# Patient Record
Sex: MALE | Race: WHITE | NOT HISPANIC OR LATINO
[De-identification: names, ages, dates, MRNs, and addresses within clinical notes are randomized per-mention and may not be internally consistent; named-entity substitution may affect disease eponyms.]

---

## 2017-01-09 ENCOUNTER — APPOINTMENT (OUTPATIENT)
Dept: OPHTHALMOLOGY | Facility: CLINIC | Age: 82
End: 2017-01-09

## 2017-01-09 DIAGNOSIS — D31.32 BENIGN NEOPLASM OF RIGHT CHOROID: ICD-10-CM

## 2017-01-09 DIAGNOSIS — D31.31 BENIGN NEOPLASM OF RIGHT CHOROID: ICD-10-CM

## 2017-01-09 DIAGNOSIS — H43.813 VITREOUS DEGENERATION, BILATERAL: ICD-10-CM

## 2017-01-16 ENCOUNTER — TRANSCRIPTION ENCOUNTER (OUTPATIENT)
Age: 82
End: 2017-01-16

## 2017-01-24 ENCOUNTER — TRANSCRIPTION ENCOUNTER (OUTPATIENT)
Age: 82
End: 2017-01-24

## 2017-02-20 ENCOUNTER — RX RENEWAL (OUTPATIENT)
Age: 82
End: 2017-02-20

## 2017-03-20 ENCOUNTER — APPOINTMENT (OUTPATIENT)
Dept: CARDIOLOGY | Facility: CLINIC | Age: 82
End: 2017-03-20

## 2017-03-20 ENCOUNTER — NON-APPOINTMENT (OUTPATIENT)
Age: 82
End: 2017-03-20

## 2017-03-20 VITALS
BODY MASS INDEX: 28.56 KG/M2 | DIASTOLIC BLOOD PRESSURE: 89 MMHG | WEIGHT: 204 LBS | HEART RATE: 62 BPM | HEIGHT: 71 IN | OXYGEN SATURATION: 95 % | SYSTOLIC BLOOD PRESSURE: 163 MMHG

## 2017-03-27 ENCOUNTER — APPOINTMENT (OUTPATIENT)
Dept: PULMONOLOGY | Facility: CLINIC | Age: 82
End: 2017-03-27

## 2017-03-27 VITALS
OXYGEN SATURATION: 97 % | RESPIRATION RATE: 16 BRPM | HEART RATE: 63 BPM | SYSTOLIC BLOOD PRESSURE: 130 MMHG | TEMPERATURE: 98.1 F | HEIGHT: 71 IN | DIASTOLIC BLOOD PRESSURE: 90 MMHG | WEIGHT: 207 LBS | BODY MASS INDEX: 28.98 KG/M2

## 2017-04-03 ENCOUNTER — MEDICATION RENEWAL (OUTPATIENT)
Age: 82
End: 2017-04-03

## 2017-04-04 ENCOUNTER — OUTPATIENT (OUTPATIENT)
Dept: OUTPATIENT SERVICES | Facility: HOSPITAL | Age: 82
LOS: 1 days | End: 2017-04-04

## 2017-04-04 ENCOUNTER — APPOINTMENT (OUTPATIENT)
Dept: CV DIAGNOSITCS | Facility: HOSPITAL | Age: 82
End: 2017-04-04

## 2017-04-04 DIAGNOSIS — Z87.01 PERSONAL HISTORY OF PNEUMONIA (RECURRENT): ICD-10-CM

## 2017-04-04 DIAGNOSIS — Z95.1 PRESENCE OF AORTOCORONARY BYPASS GRAFT: Chronic | ICD-10-CM

## 2017-04-04 DIAGNOSIS — R07.89 OTHER CHEST PAIN: ICD-10-CM

## 2017-04-10 ENCOUNTER — OUTPATIENT (OUTPATIENT)
Dept: OUTPATIENT SERVICES | Facility: HOSPITAL | Age: 82
LOS: 1 days | End: 2017-04-10
Payer: MEDICARE

## 2017-04-10 ENCOUNTER — APPOINTMENT (OUTPATIENT)
Dept: CT IMAGING | Facility: CLINIC | Age: 82
End: 2017-04-10

## 2017-04-10 DIAGNOSIS — R05 COUGH: ICD-10-CM

## 2017-04-10 DIAGNOSIS — R06.02 SHORTNESS OF BREATH: ICD-10-CM

## 2017-04-10 DIAGNOSIS — Z95.1 PRESENCE OF AORTOCORONARY BYPASS GRAFT: Chronic | ICD-10-CM

## 2017-04-10 DIAGNOSIS — Z87.01 PERSONAL HISTORY OF PNEUMONIA (RECURRENT): ICD-10-CM

## 2017-04-10 PROCEDURE — 71250 CT THORAX DX C-: CPT

## 2017-05-24 ENCOUNTER — MEDICATION RENEWAL (OUTPATIENT)
Age: 82
End: 2017-05-24

## 2017-05-25 ENCOUNTER — MEDICATION RENEWAL (OUTPATIENT)
Age: 82
End: 2017-05-25

## 2017-06-30 ENCOUNTER — APPOINTMENT (OUTPATIENT)
Dept: OPHTHALMOLOGY | Facility: CLINIC | Age: 82
End: 2017-06-30

## 2017-07-20 ENCOUNTER — MEDICATION RENEWAL (OUTPATIENT)
Age: 82
End: 2017-07-20

## 2017-07-31 ENCOUNTER — TRANSCRIPTION ENCOUNTER (OUTPATIENT)
Age: 82
End: 2017-07-31

## 2017-07-31 ENCOUNTER — INPATIENT (INPATIENT)
Facility: HOSPITAL | Age: 82
LOS: 0 days | Discharge: HOME | End: 2017-08-01
Attending: HOSPITALIST | Admitting: HOSPITALIST
Payer: MEDICARE

## 2017-07-31 VITALS
TEMPERATURE: 98 F | OXYGEN SATURATION: 98 % | DIASTOLIC BLOOD PRESSURE: 93 MMHG | RESPIRATION RATE: 20 BRPM | HEART RATE: 58 BPM | SYSTOLIC BLOOD PRESSURE: 171 MMHG

## 2017-07-31 DIAGNOSIS — R07.9 CHEST PAIN, UNSPECIFIED: ICD-10-CM

## 2017-07-31 DIAGNOSIS — Z95.1 PRESENCE OF AORTOCORONARY BYPASS GRAFT: Chronic | ICD-10-CM

## 2017-07-31 LAB
ALBUMIN SERPL ELPH-MCNC: 4.2 G/DL — SIGNIFICANT CHANGE UP (ref 3.3–5)
ALP SERPL-CCNC: 57 U/L — SIGNIFICANT CHANGE UP (ref 40–120)
ALT FLD-CCNC: 13 U/L — SIGNIFICANT CHANGE UP (ref 4–41)
APPEARANCE UR: CLEAR — SIGNIFICANT CHANGE UP
APTT BLD: 30.6 SEC — SIGNIFICANT CHANGE UP (ref 27.5–37.4)
AST SERPL-CCNC: 15 U/L — SIGNIFICANT CHANGE UP (ref 4–40)
BASE EXCESS BLDV CALC-SCNC: 1.8 MMOL/L — SIGNIFICANT CHANGE UP
BASOPHILS # BLD AUTO: 0.02 K/UL — SIGNIFICANT CHANGE UP (ref 0–0.2)
BASOPHILS NFR BLD AUTO: 0.3 % — SIGNIFICANT CHANGE UP (ref 0–2)
BILIRUB SERPL-MCNC: 0.5 MG/DL — SIGNIFICANT CHANGE UP (ref 0.2–1.2)
BILIRUB UR-MCNC: NEGATIVE — SIGNIFICANT CHANGE UP
BLD GP AB SCN SERPL QL: NEGATIVE — SIGNIFICANT CHANGE UP
BLOOD GAS VENOUS - CREATININE: 1.48 MG/DL — HIGH (ref 0.5–1.3)
BLOOD UR QL VISUAL: NEGATIVE — SIGNIFICANT CHANGE UP
BUN SERPL-MCNC: 24 MG/DL — HIGH (ref 7–23)
CALCIUM SERPL-MCNC: 9.1 MG/DL — SIGNIFICANT CHANGE UP (ref 8.4–10.5)
CHLORIDE BLDV-SCNC: 108 MMOL/L — SIGNIFICANT CHANGE UP (ref 96–108)
CHLORIDE SERPL-SCNC: 104 MMOL/L — SIGNIFICANT CHANGE UP (ref 98–107)
CK SERPL-CCNC: 130 U/L — SIGNIFICANT CHANGE UP (ref 30–200)
CO2 SERPL-SCNC: 24 MMOL/L — SIGNIFICANT CHANGE UP (ref 22–31)
COLOR SPEC: SIGNIFICANT CHANGE UP
CREAT SERPL-MCNC: 1.36 MG/DL — HIGH (ref 0.5–1.3)
EOSINOPHIL # BLD AUTO: 0.1 K/UL — SIGNIFICANT CHANGE UP (ref 0–0.5)
EOSINOPHIL NFR BLD AUTO: 1.7 % — SIGNIFICANT CHANGE UP (ref 0–6)
GAS PNL BLDV: 133 MMOL/L — LOW (ref 136–146)
GLUCOSE BLDV-MCNC: 103 — HIGH (ref 70–99)
GLUCOSE SERPL-MCNC: 102 MG/DL — HIGH (ref 70–99)
GLUCOSE UR-MCNC: NEGATIVE — SIGNIFICANT CHANGE UP
HCO3 BLDV-SCNC: 25 MMOL/L — SIGNIFICANT CHANGE UP (ref 20–27)
HCT VFR BLD CALC: 40.6 % — SIGNIFICANT CHANGE UP (ref 39–50)
HCT VFR BLDV CALC: 43.5 % — SIGNIFICANT CHANGE UP (ref 39–51)
HGB BLD-MCNC: 13.9 G/DL — SIGNIFICANT CHANGE UP (ref 13–17)
HGB BLDV-MCNC: 14.2 G/DL — SIGNIFICANT CHANGE UP (ref 13–17)
IMM GRANULOCYTES # BLD AUTO: 0.01 # — SIGNIFICANT CHANGE UP
IMM GRANULOCYTES NFR BLD AUTO: 0.2 % — SIGNIFICANT CHANGE UP (ref 0–1.5)
INR BLD: 1.02 — SIGNIFICANT CHANGE UP (ref 0.88–1.17)
KETONES UR-MCNC: NEGATIVE — SIGNIFICANT CHANGE UP
LACTATE BLDV-MCNC: 1.2 MMOL/L — SIGNIFICANT CHANGE UP (ref 0.5–2)
LEUKOCYTE ESTERASE UR-ACNC: NEGATIVE — SIGNIFICANT CHANGE UP
LYMPHOCYTES # BLD AUTO: 0.84 K/UL — LOW (ref 1–3.3)
LYMPHOCYTES # BLD AUTO: 14.1 % — SIGNIFICANT CHANGE UP (ref 13–44)
MCHC RBC-ENTMCNC: 32 PG — SIGNIFICANT CHANGE UP (ref 27–34)
MCHC RBC-ENTMCNC: 34.2 % — SIGNIFICANT CHANGE UP (ref 32–36)
MCV RBC AUTO: 93.5 FL — SIGNIFICANT CHANGE UP (ref 80–100)
MONOCYTES # BLD AUTO: 0.54 K/UL — SIGNIFICANT CHANGE UP (ref 0–0.9)
MONOCYTES NFR BLD AUTO: 9.1 % — SIGNIFICANT CHANGE UP (ref 2–14)
MUCOUS THREADS # UR AUTO: SIGNIFICANT CHANGE UP
NEUTROPHILS # BLD AUTO: 4.44 K/UL — SIGNIFICANT CHANGE UP (ref 1.8–7.4)
NEUTROPHILS NFR BLD AUTO: 74.6 % — SIGNIFICANT CHANGE UP (ref 43–77)
NITRITE UR-MCNC: NEGATIVE — SIGNIFICANT CHANGE UP
NRBC # FLD: 0 — SIGNIFICANT CHANGE UP
PCO2 BLDV: 47 MMHG — SIGNIFICANT CHANGE UP (ref 41–51)
PH BLDV: 7.37 PH — SIGNIFICANT CHANGE UP (ref 7.32–7.43)
PH UR: 6.5 — SIGNIFICANT CHANGE UP (ref 4.6–8)
PLATELET # BLD AUTO: 137 K/UL — LOW (ref 150–400)
PMV BLD: 11.4 FL — SIGNIFICANT CHANGE UP (ref 7–13)
PO2 BLDV: 43 MMHG — HIGH (ref 35–40)
POTASSIUM BLDV-SCNC: 4.5 MMOL/L — SIGNIFICANT CHANGE UP (ref 3.4–4.5)
POTASSIUM SERPL-MCNC: 4.9 MMOL/L — SIGNIFICANT CHANGE UP (ref 3.5–5.3)
POTASSIUM SERPL-SCNC: 4.9 MMOL/L — SIGNIFICANT CHANGE UP (ref 3.5–5.3)
PROT SERPL-MCNC: 7.3 G/DL — SIGNIFICANT CHANGE UP (ref 6–8.3)
PROT UR-MCNC: NEGATIVE — SIGNIFICANT CHANGE UP
PROTHROM AB SERPL-ACNC: 11.4 SEC — SIGNIFICANT CHANGE UP (ref 9.8–13.1)
RBC # BLD: 4.34 M/UL — SIGNIFICANT CHANGE UP (ref 4.2–5.8)
RBC # FLD: 13.3 % — SIGNIFICANT CHANGE UP (ref 10.3–14.5)
RBC CASTS # UR COMP ASSIST: SIGNIFICANT CHANGE UP (ref 0–?)
RH IG SCN BLD-IMP: POSITIVE — SIGNIFICANT CHANGE UP
SAO2 % BLDV: 73.6 % — SIGNIFICANT CHANGE UP (ref 60–85)
SODIUM SERPL-SCNC: 139 MMOL/L — SIGNIFICANT CHANGE UP (ref 135–145)
SP GR SPEC: 1.01 — SIGNIFICANT CHANGE UP (ref 1–1.03)
SQUAMOUS # UR AUTO: SIGNIFICANT CHANGE UP
TROPONIN T SERPL-MCNC: < 0.06 NG/ML — SIGNIFICANT CHANGE UP (ref 0–0.06)
UROBILINOGEN FLD QL: NORMAL E.U. — SIGNIFICANT CHANGE UP (ref 0.1–0.2)
WBC # BLD: 5.95 K/UL — SIGNIFICANT CHANGE UP (ref 3.8–10.5)
WBC # FLD AUTO: 5.95 K/UL — SIGNIFICANT CHANGE UP (ref 3.8–10.5)
WBC UR QL: SIGNIFICANT CHANGE UP (ref 0–?)

## 2017-07-31 PROCEDURE — 71020: CPT | Mod: 26

## 2017-07-31 PROCEDURE — 99223 1ST HOSP IP/OBS HIGH 75: CPT

## 2017-07-31 PROCEDURE — 93459 L HRT ART/GRFT ANGIO: CPT | Mod: 26

## 2017-07-31 RX ORDER — ASPIRIN/CALCIUM CARB/MAGNESIUM 324 MG
81 TABLET ORAL ONCE
Qty: 0 | Refills: 0 | Status: COMPLETED | OUTPATIENT
Start: 2017-07-31 | End: 2017-07-31

## 2017-07-31 RX ORDER — HYDRALAZINE HCL 50 MG
5 TABLET ORAL ONCE
Qty: 0 | Refills: 0 | Status: DISCONTINUED | OUTPATIENT
Start: 2017-07-31 | End: 2017-08-01

## 2017-07-31 RX ORDER — SIMVASTATIN 20 MG/1
20 TABLET, FILM COATED ORAL AT BEDTIME
Qty: 0 | Refills: 0 | Status: DISCONTINUED | OUTPATIENT
Start: 2017-07-31 | End: 2017-08-01

## 2017-07-31 RX ORDER — LISINOPRIL 2.5 MG/1
20 TABLET ORAL DAILY
Qty: 0 | Refills: 0 | Status: DISCONTINUED | OUTPATIENT
Start: 2017-07-31 | End: 2017-08-01

## 2017-07-31 RX ORDER — ASPIRIN/CALCIUM CARB/MAGNESIUM 324 MG
81 TABLET ORAL DAILY
Qty: 0 | Refills: 0 | Status: DISCONTINUED | OUTPATIENT
Start: 2017-07-31 | End: 2017-08-01

## 2017-07-31 RX ORDER — CARVEDILOL PHOSPHATE 80 MG/1
3.12 CAPSULE, EXTENDED RELEASE ORAL EVERY 12 HOURS
Qty: 0 | Refills: 0 | Status: DISCONTINUED | OUTPATIENT
Start: 2017-07-31 | End: 2017-08-01

## 2017-07-31 RX ORDER — HYDRALAZINE HCL 50 MG
10 TABLET ORAL ONCE
Qty: 0 | Refills: 0 | Status: DISCONTINUED | OUTPATIENT
Start: 2017-07-31 | End: 2017-07-31

## 2017-07-31 RX ORDER — HYDRALAZINE HCL 50 MG
5 TABLET ORAL ONCE
Qty: 0 | Refills: 0 | Status: DISCONTINUED | OUTPATIENT
Start: 2017-07-31 | End: 2017-07-31

## 2017-07-31 RX ORDER — ACETAMINOPHEN 500 MG
650 TABLET ORAL EVERY 6 HOURS
Qty: 0 | Refills: 0 | Status: DISCONTINUED | OUTPATIENT
Start: 2017-07-31 | End: 2017-08-01

## 2017-07-31 RX ORDER — CLOPIDOGREL BISULFATE 75 MG/1
75 TABLET, FILM COATED ORAL DAILY
Qty: 0 | Refills: 0 | Status: DISCONTINUED | OUTPATIENT
Start: 2017-07-31 | End: 2017-08-01

## 2017-07-31 RX ADMIN — Medication 81 MILLIGRAM(S): at 13:59

## 2017-07-31 RX ADMIN — SIMVASTATIN 20 MILLIGRAM(S): 20 TABLET, FILM COATED ORAL at 23:52

## 2017-07-31 NOTE — H&P CARDIOLOGY - HISTORY OF PRESENT ILLNESS
81 year old male with HTN, hyperlipidemia, descending aortic aneurysm surgically repaired/CABG (LIMA to LAD) 2000 with subsequent PTCA/stent per patient, AVNRT ablation 2014 who presented to [facility] ED 7/31/17 complaining of SOB and chest pain for the past 3weeks. Patient explains that he has noted labored breathing with associated chest pressure walking up a flight of stairs, relieved with rest. Admits to a decrease in exercise tolerance and increase in fatigue. Patient subsequently saw an advertisement for heart disease and developed concern he could need another stent, this prompted him to present to the ED.   First troponin negative, EKG with Twave inversions AVL.   In light of patients CAD history and symptoms there is high suspicion for CAD progression. Patient is now referred to [facility] for a cardiac catheterization with possible PTCA/stent. 81 year old male with HTN, hyperlipidemia, descending aortic aneurysm surgically repaired/CABG (LIMA to LAD) 2000 with subsequent PTCA/stent per patient, AVNRT ablation 2014 who presented to [facility] ED 7/31/17 complaining of SOB and chest pain for the past 3weeks. Patient explains that he has noted labored breathing with associated chest pressure walking up a flight of stairs, relieved with rest. Admits to a decrease in exercise tolerance and increase in fatigue. Patient subsequently saw an advertisement for heart disease and developed concern he could need another stent, this prompted him to present to the ED.   First troponin negative, EKG with Twave inversions AVL.   In light of patients CAD history and symptoms there is high suspicion for CAD progression. Patient is now referred to [facility] for a cardiac catheterization with possible PTCA/stent.

## 2017-07-31 NOTE — DISCHARGE NOTE ADULT - PLAN OF CARE
remain chest pain free Follow up with Dr White in 1week  continue prescribed medications  low salt, low fat, low cholesterol /60 continue prescribed medications  low salt, low fat, low cholesterol Maintain normal cholesterol levels to prevent stroke and heart attack Low fat diet, Continue current medications. Follow up with primary care physician and cardiologist To prevent chest pain, heart attack and worsening coronary artery disease To maintain a normal blood pressure to prevent heart attack, stroke and renal failure Follow up with Dr White in 1-2 weeks  continue prescribed medications  low salt, low fat, low cholesterol

## 2017-07-31 NOTE — ED ADULT NURSE NOTE - PSH
Aneurysm of descending aorta  12 years ago  H/O: Knee Surgery    S/P CABG (coronary artery bypass graft)  LIMA to LAD  stent

## 2017-07-31 NOTE — DISCHARGE NOTE ADULT - CARE PLAN
Principal Discharge DX:	CAD (coronary artery disease)  Goal:	remain chest pain free  Instructions for follow-up, activity and diet:	Follow up with Dr White in 1week  continue prescribed medications  low salt, low fat, low cholesterol  Secondary Diagnosis:	HTN (hypertension)  Goal:	/60  Instructions for follow-up, activity and diet:	continue prescribed medications  low salt, low fat, low cholesterol Principal Discharge DX:	CAD (coronary artery disease)  Goal:	To prevent chest pain, heart attack and worsening coronary artery disease  Instructions for follow-up, activity and diet:	Follow up with Dr White in 1week  continue prescribed medications  low salt, low fat, low cholesterol  Secondary Diagnosis:	HTN (hypertension)  Goal:	To maintain a normal blood pressure to prevent heart attack, stroke and renal failure  Instructions for follow-up, activity and diet:	continue prescribed medications  low salt, low fat, low cholesterol  Secondary Diagnosis:	Hyperlipidemia  Goal:	Maintain normal cholesterol levels to prevent stroke and heart attack  Instructions for follow-up, activity and diet:	Low fat diet, Continue current medications. Follow up with primary care physician and cardiologist Principal Discharge DX:	CAD (coronary artery disease)  Goal:	To prevent chest pain, heart attack and worsening coronary artery disease  Instructions for follow-up, activity and diet:	Follow up with Dr White in 1-2 weeks  continue prescribed medications  low salt, low fat, low cholesterol  Secondary Diagnosis:	HTN (hypertension)  Goal:	To maintain a normal blood pressure to prevent heart attack, stroke and renal failure  Instructions for follow-up, activity and diet:	continue prescribed medications  low salt, low fat, low cholesterol  Secondary Diagnosis:	Hyperlipidemia  Goal:	Maintain normal cholesterol levels to prevent stroke and heart attack  Instructions for follow-up, activity and diet:	Low fat diet, Continue current medications. Follow up with primary care physician and cardiologist

## 2017-07-31 NOTE — DISCHARGE NOTE ADULT - HOSPITAL COURSE
81 year old male with HTN, hyperlipidemia, descending aortic aneurysm surgically repaired/CABG (LIMA to LAD) 2000 with subsequent PTCA/stent per patient, AVNRT ablation 2014 who presented to [facility] ED 7/31/17 complaining of SOB and chest pain for the past 3weeks. Patient explains that he has noted labored breathing with associated chest pressure walking up a flight of stairs, relieved with rest. Admits to a decrease in exercise tolerance and increase in fatigue. Patient subsequently saw an advertisement for heart disease and developed concern he could need another stent, this prompted him to present to the ED.   First troponin negative, EKG with Twave inversions AVL.   In light of patients CAD history and symptoms there is high suspicion for CAD progression. Patient is now referred to [facility] for a cardiac catheterization with possible PTCA/stent.   Underwent a Cardiac catheterization via right femoral artery access revealing..................................................................  Patient without complaints, stable for discharge to home if femoral artery access remains stable with ambulation with outpatient cardiologist follow up with Dr White. 81 year old male with HTN, hyperlipidemia, descending aortic aneurysm surgically repaired/CABG (LIMA to LAD) 2000 with subsequent PTCA/stent per patient, AVNRT ablation 2014 who presented to [facility] ED 7/31/17 complaining of SOB and chest pain for the past 3weeks. Patient explains that he has noted labored breathing with associated chest pressure walking up a flight of stairs, relieved with rest. Admits to a decrease in exercise tolerance and increase in fatigue. Patient subsequently saw an advertisement for heart disease and developed concern he could need another stent, this prompted him to present to the ED.   First troponin negative, EKG with Twave inversions AVL.   In light of patients CAD history and symptoms there is high suspicion for CAD progression. Patient is now referred to [facility] for a cardiac catheterization with possible PTCA/stent.   Underwent a Cardiac catheterization via right femoral artery access revealing mLAD 100 ISR, dLAD 80, pLCx 20-30, pOM2 40, RCA luminal, Diag1 20 ISR, LIMA to LAD patent; - which is stable from previous cath. RFA site stable no hematoma, no bleeding .   Patient without complaints, stable for discharge to home. Outpatient cardiologist follow up with Dr White.

## 2017-07-31 NOTE — CONSULT NOTE ADULT - ASSESSMENT
81M with CAD , CABG 2000 , AVNRT ablation, HTN. Patient presents with typical chest pain, new for three weeks.    - chest pain concerning for unstable angina  - given high probability of CAD, will proceed directly to [facility] today.   - keep NPO  -   - plavix 600  -atorvastatin 80   - general cardiology consult to continue to follow  - thank you for this interesting consult.   - For any questions please call [phone]

## 2017-07-31 NOTE — DISCHARGE NOTE ADULT - INSTRUCTIONS
No driving x 24 hours.  No heavy lifting for one week. No strenuous activity x 3 weeks.  Monitor site of procedure and notify your doctor for any redness/swelling/discharge.  You may shower but no baths or swimming for one week or until skin is healed. low salt, low cholesterol diet

## 2017-07-31 NOTE — H&P CARDIOLOGY - PMH
Aneurysm of descending aorta  treated surgically 12 years ago  CAD (Coronary Artery Disease)    CHF (Congestive Heart Failure)    Dyslipidemia    History of prostate cancer  treated with cryotherapy  HTN (Hypertension), Benign    Hyperlipidemia    Old MI (myocardial infarction)  12 years ago  Status post primary angioplasty with coronary stent  5-6 years ago Aneurysm of descending aorta  treated surgically 12 years ago  Back spasm  patient admits to left sided back spasms for a long time that is worse laying in bed for long periods of time  CAD (Coronary Artery Disease)    CHF (Congestive Heart Failure)    Dyslipidemia    History of prostate cancer  treated with cryotherapy  HTN (Hypertension), Benign    Hyperlipidemia    Old MI (myocardial infarction)  12 years ago  Status post primary angioplasty with coronary stent  5-6 years ago

## 2017-07-31 NOTE — CONSULT NOTE ADULT - SUBJECTIVE AND OBJECTIVE BOX
HISTORY OF PRESENT ILLNESS:  81M hx descending aortic aneurism CABG 2000,  AVNRT ablation 2014,     Has three weeks of worsening dyspnea , which he describes as labored breathing.  Patient also has central substernal chest pain radiating to arms. 5/10, relieved by rest, worsened by exertion or stress. Absent at rest.  He has had similar symptoms in the past when he required a stent.    No fever, chills, no aches in limbs, no KOLTON, orthopnea.         Allergies    No Known Allergies    Intolerances    	    MEDICATIONS:  ASA 81  Plavix 75  lisinopril 20  simvastatin 40  metoprolol succ 50 XL              PAST MEDICAL & SURGICAL HISTORY:  Status post primary angioplasty with coronary stent: 5-6 years ago  Old MI (myocardial infarction): 12 years ago  History of prostate cancer: treated with cryotherapy  Aneurysm of descending aorta: treated surgically 12 years ago  CAD (Coronary Artery Disease)  HTN (Hypertension), Benign  Dyslipidemia  CHF (Congestive Heart Failure)  S/P CABG (coronary artery bypass graft): LIMA to LAD  Aneurysm of descending aorta: 12 years ago  H/O: Knee Surgery  stent      FAMILY HISTORY:  Family history of blood clots  Family history of coronary artery disease: Mother and father      SOCIAL HISTORY:    [ ] Non-smoker  [ ] Smoker-  former quit 50 years ago.   [ ] Alcohol -  lives at home, [place]    REVIEW OF SYSTEMS:  General: no fatigue/malaise, weight loss/gain.  Skin: no rashes.  Ophthalmologic: no blurred vision, no loss of vision. 	  ENT: no sore throat, rhinorrhea, sinus congestion.  Respiratory: no SOB, cough or wheeze.  Gastrointestinal:  no N/V/D, no melena/hematemesis/hematochezia.  Genitourinary: no dysuria/hesitancy or hematuria.  Musculoskeletal: no myalgias or arthralgias.  Neurological: no changes in vision or hearing, no lightheadedness/dizziness, no syncope/near syncope	  Psychiatric: no unusual stress/anxiety.   Hematology/Lymphatics: no unusual bleeding, bruising and no lymphadenopathy.  Endocrine: no unusual thirst.   All others negative except as stated above and in HPI.    PHYSICAL EXAM:  T(C): 36.4 (07-31-17 @ 10:38), Max: 36.4 (07-31-17 @ 10:38)  HR: 57 (07-31-17 @ 11:01) (57 - 58)  BP: 198/83 (07-31-17 @ 11:01) (171/93 - 198/83)  RR: 18 (07-31-17 @ 11:01) (18 - 20)  SpO2: 99% (07-31-17 @ 11:01) (98% - 99%)  Wt(kg): --  I&O's Summary      Appearance: Normal	  HEENT:   Normal oral mucosa, PERRL, EOMI	  Lymphatic: No lymphadenopathy  Cardiovascular: Normal S1 S2, No JVD, No murmurs, No edema  Respiratory: Lungs clear to auscultation	  Psychiatry: A & O x 3, Mood & affect appropriate  Gastrointestinal:  Soft, Non-tender, + BS	  Skin: No rashes, No ecchymoses, No cyanosis	  Neurologic: Non-focal  Extremities: Normal range of motion, No clubbing, cyanosis or edema  Vascular: Peripheral pulses palpable 2+ bilaterally        LABS:	 	    CBC Full  -  ( 31 Jul 2017 11:10 )  WBC Count : 5.95 K/uL  Hemoglobin : 13.9 g/dL  Hematocrit : 40.6 %  Platelet Count - Automated : 137 K/uL  Mean Cell Volume : 93.5 fL  Mean Cell Hemoglobin : 32.0 pg  Mean Cell Hemoglobin Concentration : 34.2 %  Auto Neutrophil # : 4.44 K/uL  Auto Lymphocyte # : 0.84 K/uL  Auto Monocyte # : 0.54 K/uL  Auto Eosinophil # : 0.10 K/uL  Auto Basophil # : 0.02 K/uL  Auto Neutrophil % : 74.6 %  Auto Lymphocyte % : 14.1 %  Auto Monocyte % : 9.1 %  Auto Eosinophil % : 1.7 %  Auto Basophil % : 0.3 %    07-31    139  |  104  |  24<H>  ----------------------------<  102<H>  4.9   |  24  |  1.36<H>    Ca    9.1      31 Jul 2017 11:10    TPro  7.3  /  Alb  4.2  /  TBili  0.5  /  DBili  x   /  AST  15  /  ALT  13  /  AlkPhos  57  07-31      proBNP:   Lipid Profile:   HgA1c:   TSH:       CARDIAC MARKERS:  Troponin T, Serum (07.31.17 @ 11:10)    Troponin T, Serum: < 0.06: INCLUDES THE 99th PERCENTILE OF A HEALTHY  POPULATION AT A  METHOD C.V. OF 10% OR LESS.    TROPONIN T IS MEASURED BY THE ROCHE ELECSYS ECLIA METHOD. ng/mL      TELEMETRY: 	    ECG:  	NSR @60 , nonspecific chronic ST changes.   RADIOLOGY:  OTHER: 	    PREVIOUS DIAGNOSTIC TESTING:    [ ] Echocardiogram: < from: TTE with Doppler (w/Cont) (04.04.17 @ 07:13) >    Patient name: FREDERIC COSTELLO  YOB: 1935   Age: 81 (M)   MR#: [id number]  Study Date: 4/4/2017  Location: O/PSonographer: Zeinab Marie  Study quality: Technically Difficult  Referring Physician: Rubina White MD  Blood Pressure: 123/69 mmHg  Height: 178 cm  Weight: 91 kg  BSA: 2.1 m2  ------------------------------------------------------------------------  PROCEDURE: Transthoracic echocardiogram with 2-D, M-Mode  and complete spectral and color flow Doppler.  Verbal consent wasobtained for injection of echo contrast.  Following  intravenous injection of contrast, harmonic  imaging was performed.  INDICATION: Abnormal electrocardiogram (ECG) (EKG) (R94.31)  ------------------------------------------------------------------------  OBSERVATIONS:  Mitral Valve: Mitral annular calcification, otherwise  normal mitral valve. Minimal mitral regurgitation.  Aortic Root: Normal aortic root.  Aortic Valve: Aortic valve leaflet morphology not well  visualized.  Left Atrium: Normalleft atrium.  Left Ventricle: Endocardium not well visualized; grossly  mild to moderate segmental left ventricular systolic  dysfunction.  Hypokinesis of the basal inferior wall,  lateral wall, and inferolateral wall.  Endocardial  visualization enhanced with intravenous injection of echo  contrast (Definity).  Right Heart: Normal right atrium. The right ventricle is  not well visualized; grossly normal right ventricular  systolic function. Normal tricuspid valve. Minimal  tricuspid regurgitation. Normal pulmonic valve.  Pericardium/PleuraNormal pericardium with no pericardial  effusion.  ------------------------------------------------------------------------  CONCLUSIONS:  1. Mitral annular calcification, otherwise normal mitral  valve. Minimal mitral regurgitation.  2. Endocardium not well visualized; grossly mild to  moderate segmental left ventricular systolic dysfunction.  Hypokinesis of the basal inferior wall,  lateral wall, and  inferolateral wall.  Endocardial visualization enhanced  with intravenous injection of echo contrast (Definity).  3. The right ventricle is not well visualized; grossly  normal right ventricular systolic function.  *** Compared with echocardiogram of 5/19/2016, no  significant changes noted.  ------------------------------------------------------------------------  Confirmed on  4/4/2017 - 09:29:52 by Austen Patel M.D.  ------------------------------------------------------------------------    < end of copied text >    [ ]  Catheterization:  [ ] Stress Test:

## 2017-07-31 NOTE — H&P CARDIOLOGY - FAMILY HISTORY
Father  Still living? No  Family history of coronary artery disease, Age at diagnosis: Age Unknown  Family history of blood clots, Age at diagnosis: Age Unknown     Mother  Still living? No  Family history of coronary artery disease, Age at diagnosis: Age Unknown

## 2017-07-31 NOTE — DISCHARGE NOTE ADULT - PATIENT PORTAL LINK FT
“You can access the [facility] Patient Portal, offered by [facility], by registering with the following website: [email or web address]”

## 2017-07-31 NOTE — ED ADULT NURSE NOTE - PMH
Aneurysm of descending aorta  treated surgically 12 years ago  CAD (Coronary Artery Disease)    CHF (Congestive Heart Failure)    Dyslipidemia    History of prostate cancer  treated with cryotherapy  HTN (Hypertension), Benign    Old MI (myocardial infarction)  12 years ago  Status post primary angioplasty with coronary stent  5-6 years ago

## 2017-07-31 NOTE — ED PROVIDER NOTE - ATTENDING CONTRIBUTION TO CARE
Dr. Cardenas: I performed a face to face bedside interview with patient regarding history of present illness, review of symptoms and past medical history. I completed an independent physical exam.  I have discussed patient's plan of care with PA.   I agree with note as stated above, having amended the EMR as needed to reflect my findings.   This includes HISTORY OF PRESENT ILLNESS, HIV, PAST MEDICAL/SURGICAL/FAMILY/SOCIAL HISTORY, ALLERGIES AND HOME MEDICATIONS, REVIEW OF SYSTEMS, PHYSICAL EXAM, and any PROGRESS NOTES during the time I functioned as the attending physician for this patient. HPI above as by me. PE above as by me. EKG nsr nl axis neg st abn t wave inv v5-v6 DDX Worsening exertional chest pain in 82 yo male with known CAD, high risk for acs PLAN addl aspirin 81 mg po, cbc, cmp, trop, ck ckmb, cxr, call Christopher. Recommend admission for inpatient stress test.

## 2017-07-31 NOTE — DISCHARGE NOTE ADULT - MEDICATION SUMMARY - MEDICATIONS TO TAKE
I will START or STAY ON the medications listed below when I get home from the hospital:    Ecotrin Adult Low Strength 81 mg oral delayed release tablet  -- 1 tab(s) by mouth once a day  -- Indication: For CAD (coronary artery disease)    lisinopril 20 mg oral tablet  -- 1 tab(s) by mouth once a day  -- Indication: For CAD (coronary artery disease)    simvastatin 20 mg oral tablet  -- 1 tab(s) by mouth once a day (at bedtime)  -- Indication: For Hyperlipidemia    clopidogrel 75 mg oral tablet  -- 1 tab(s) by mouth once a day  -- Indication: For CAD (coronary artery disease)    carvedilol 3.125 mg oral tablet  -- 1 tab(s) by mouth 2 times a day  -- Indication: For CAD (coronary artery disease)

## 2017-07-31 NOTE — H&P CARDIOLOGY - NS MD HP PULSE RADIAL
right normal/vasiliy test normal right/left normal left normal/vasiliy test normal bilateral/right normal

## 2017-07-31 NOTE — ED PROVIDER NOTE - OBJECTIVE STATEMENT
82 y/o male pmh htn, hld, CAD s/p 2 stents c/o chest pain and sob x2 weeks, worse x2 days. Pt admits to exertional chest pain and sob over the last 2 weeks. Pt admits to having symptoms after walking up 2 flights of stairs or riding a stationary bike x2 minutes. Pt states that this feels similar to when he had his last stent placed. Pt admits to relief of symptoms with rest. Denies diaphoresis, palpitations, n/v/d, numbness, tingling, weakness, dizziness, syncope, fever or chills. 82 y/o male pmh htn, hld, CAD s/p 2 stents c/o chest pain and sob x2 weeks, worse x2 days. Pt admits to exertional chest pain and sob over the last 2 weeks. Pt admits to having symptoms after walking up 2 flights of stairs or riding a stationary bike x2 minutes. Pt states that this feels similar to when he had his last stent placed. Pt admits to relief of symptoms with rest. Denies diaphoresis, palpitations, n/v/d, numbness, tingling, weakness, dizziness, syncope, fever or chills.    12:56 [facility] att: 81M h/o htn, hld, cad, 2 stents (date unk), descending aortic stent (10-15 yrs ago) c/o exertional cp x wks. Past few weeks patient notes "any exertion" will elicit labored breathing and chest pain. Specifies midsternal, dull pressure, rad rue, x minutes, resolves with rest. As of last day patient unable to walk or do anything secondary to chest pain. Called his Cardiologist Dr. Rubina White to arrange outpatient stress test, Christopher on vacation, office directed patient to ED for eval. Patient took Aspiring 81 mg po prior to ED arrival.

## 2017-07-31 NOTE — DISCHARGE NOTE ADULT - CARE PROVIDER_API CALL
Rubina White), Cardiology; Internal Medicine  [address] [place] [address]  Phone: [phone]  Fax: [phone]

## 2017-07-31 NOTE — ED ADULT NURSE NOTE - OBJECTIVE STATEMENT
Received patient to room 14. Patient is alert and oriented times four with c/o chest pain and the symptoms he is experiencing are the same as the ones when he had his second stent placed. IVL placed to left AC 20 gauge and labs drawn and sent. Pt evaluated by MD. VS recorded and systolic blood pressure is elevated. Pt is on cardiac monitor and awasiting results, further orders and disposition.     ONDINA Morgan

## 2017-07-31 NOTE — DISCHARGE NOTE ADULT - NS AS ACTIVITY OBS
No driving x 24 hours.  No heavy lifting for one week. No strenuous activity x 3 weeks.  Monitor site of procedure and notify your doctor for any redness/swelling/discharge.  You may shower but no baths or swimming for one week or until skin is healed./Walking-Indoors allowed/Walking-Outdoors allowed/Stairs allowed/Showering allowed/No Heavy lifting/straining

## 2017-08-01 VITALS
OXYGEN SATURATION: 100 % | RESPIRATION RATE: 18 BRPM | SYSTOLIC BLOOD PRESSURE: 136 MMHG | TEMPERATURE: 98 F | HEART RATE: 55 BPM | DIASTOLIC BLOOD PRESSURE: 68 MMHG

## 2017-08-01 DIAGNOSIS — I25.10 ATHEROSCLEROTIC HEART DISEASE OF NATIVE CORONARY ARTERY WITHOUT ANGINA PECTORIS: ICD-10-CM

## 2017-08-01 DIAGNOSIS — D69.6 THROMBOCYTOPENIA, UNSPECIFIED: ICD-10-CM

## 2017-08-01 DIAGNOSIS — R79.89 OTHER SPECIFIED ABNORMAL FINDINGS OF BLOOD CHEMISTRY: ICD-10-CM

## 2017-08-01 DIAGNOSIS — I10 ESSENTIAL (PRIMARY) HYPERTENSION: ICD-10-CM

## 2017-08-01 DIAGNOSIS — I20.9 ANGINA PECTORIS, UNSPECIFIED: ICD-10-CM

## 2017-08-01 LAB
BUN SERPL-MCNC: 24 MG/DL — HIGH (ref 7–23)
CALCIUM SERPL-MCNC: 9.1 MG/DL — SIGNIFICANT CHANGE UP (ref 8.4–10.5)
CHLORIDE SERPL-SCNC: 104 MMOL/L — SIGNIFICANT CHANGE UP (ref 98–107)
CO2 SERPL-SCNC: 22 MMOL/L — SIGNIFICANT CHANGE UP (ref 22–31)
CREAT SERPL-MCNC: 1.21 MG/DL — SIGNIFICANT CHANGE UP (ref 0.5–1.3)
GLUCOSE SERPL-MCNC: 92 MG/DL — SIGNIFICANT CHANGE UP (ref 70–99)
HCT VFR BLD CALC: 42.4 % — SIGNIFICANT CHANGE UP (ref 39–50)
HGB BLD-MCNC: 14.4 G/DL — SIGNIFICANT CHANGE UP (ref 13–17)
MCHC RBC-ENTMCNC: 31.4 PG — SIGNIFICANT CHANGE UP (ref 27–34)
MCHC RBC-ENTMCNC: 34 % — SIGNIFICANT CHANGE UP (ref 32–36)
MCV RBC AUTO: 92.6 FL — SIGNIFICANT CHANGE UP (ref 80–100)
NRBC # FLD: 0 — SIGNIFICANT CHANGE UP
PLATELET # BLD AUTO: 134 K/UL — LOW (ref 150–400)
PMV BLD: 11.3 FL — SIGNIFICANT CHANGE UP (ref 7–13)
POTASSIUM SERPL-MCNC: 4.7 MMOL/L — SIGNIFICANT CHANGE UP (ref 3.5–5.3)
POTASSIUM SERPL-SCNC: 4.7 MMOL/L — SIGNIFICANT CHANGE UP (ref 3.5–5.3)
RBC # BLD: 4.58 M/UL — SIGNIFICANT CHANGE UP (ref 4.2–5.8)
RBC # FLD: 13.2 % — SIGNIFICANT CHANGE UP (ref 10.3–14.5)
SODIUM SERPL-SCNC: 141 MMOL/L — SIGNIFICANT CHANGE UP (ref 135–145)
WBC # BLD: 4.65 K/UL — SIGNIFICANT CHANGE UP (ref 3.8–10.5)
WBC # FLD AUTO: 4.65 K/UL — SIGNIFICANT CHANGE UP (ref 3.8–10.5)

## 2017-08-01 PROCEDURE — 99238 HOSP IP/OBS DSCHRG MGMT 30/<: CPT

## 2017-08-01 RX ADMIN — CARVEDILOL PHOSPHATE 3.12 MILLIGRAM(S): 80 CAPSULE, EXTENDED RELEASE ORAL at 05:22

## 2017-08-01 RX ADMIN — LISINOPRIL 20 MILLIGRAM(S): 2.5 TABLET ORAL at 05:23

## 2017-08-01 NOTE — CHART NOTE - NSCHARTNOTEFT_GEN_A_CORE
Status post Cath, Right femoral site without bleeding or hematoma.  Dressing is intact.  Positive Pulses.  Will continue to monitor.                                                                                                                                                  ROBYN Chou, RPA-C [id number]

## 2017-08-01 NOTE — PROGRESS NOTE ADULT - SUBJECTIVE AND OBJECTIVE BOX
24H hour events:  Patient feels much better, remains chest pain free. asking to go home.     MEDICATIONS:  aspirin enteric coated 81 milliGRAM(s) Oral daily  lisinopril 20 milliGRAM(s) Oral daily  clopidogrel Tablet 75 milliGRAM(s) Oral daily  carvedilol 3.125 milliGRAM(s) Oral every 12 hours  hydrALAZINE Injectable 5 milliGRAM(s) IV Push once        acetaminophen   Tablet. 650 milliGRAM(s) Oral every 6 hours PRN      simvastatin 20 milliGRAM(s) Oral at bedtime        REVIEW OF SYSTEMS:  General: no fatigue/malaise, weight loss/gain.  Skin: no rashes.  Ophthalmologic: no blurred vision, no loss of vision. 	  ENT: no sore throat, rhinorrhea, sinus congestion.  Respiratory: no SOB, cough or wheeze.  Gastrointestinal:  no N/V/D, no melena/hematemesis/hematochezia.  Genitourinary: no dysuria/hesitancy or hematuria.  Musculoskeletal: no myalgias or arthralgias.  Neurological: no changes in vision or hearing, no lightheadedness/dizziness, no syncope/near syncope	  Psychiatric: no unusual stress/anxiety.   Hematology/Lymphatics: no unusual bleeding, bruising and no lymphadenopathy.  Endocrine: no unusual thirst.   All others negative except as stated above and in HPI.      PHYSICAL EXAM:  T(C): 36.6 (08-01-17 @ 05:21), Max: 36.6 (08-01-17 @ 05:21)  HR: 55 (08-01-17 @ 05:21) (55 - 60)  BP: 136/68 (08-01-17 @ 05:21) (136/68 - 211/99)  RR: 18 (08-01-17 @ 05:21) (18 - 18)  SpO2: 100% (08-01-17 @ 05:21) (99% - 100%)  Wt(kg): --  I&O's Summary      Appearance: Normal	  HEENT:   Normal oral mucosa, PERRL, EOMI	  Lymphatic: No lymphadenopathy  Cardiovascular: Normal S1 S2, No JVD, No murmurs, No edema  Respiratory: Lungs clear to auscultation	  Psychiatry: A & O x 3, Mood & affect appropriate  Gastrointestinal:  Soft, Non-tender, + BS	  Skin: No rashes, No ecchymoses, No cyanosis	  Neurologic: Non-focal  Extremities: Normal range of motion, No clubbing, cyanosis or edema  Vascular: Peripheral pulses palpable 2+ bilaterally        LABS:	 	    CBC Full  -  ( 01 Aug 2017 07:42 )  WBC Count : 4.65 K/uL  Hemoglobin : 14.4 g/dL  Hematocrit : 42.4 %  Platelet Count - Automated : 134 K/uL  Mean Cell Volume : 92.6 fL  Mean Cell Hemoglobin : 31.4 pg  Mean Cell Hemoglobin Concentration : 34.0 %  Auto Neutrophil # : x  Auto Lymphocyte # : x  Auto Monocyte # : x  Auto Eosinophil # : x  Auto Basophil # : x  Auto Neutrophil % : x  Auto Lymphocyte % : x  Auto Monocyte % : x  Auto Eosinophil % : x  Auto Basophil % : x    08-01    141  |  104  |  24<H>  ----------------------------<  92  4.7   |  22  |  1.21  07-31    139  |  104  |  24<H>  ----------------------------<  102<H>  4.9   |  24  |  1.36<H>    Ca    9.1      01 Aug 2017 07:42  Ca    9.1      31 Jul 2017 11:10    TPro  7.3  /  Alb  4.2  /  TBili  0.5  /  DBili  x   /  AST  15  /  ALT  13  /  AlkPhos  57  07-31      proBNP:   Lipid Profile:   HgA1c:   TSH:       CARDIAC MARKERS:            TELEMETRY: 	   sinus 40 overnight, 70 during awake hours  ECG:

## 2017-08-01 NOTE — PROGRESS NOTE ADULT - SUBJECTIVE AND OBJECTIVE BOX
Patient is a 81y old  Male who presents with a chief complaint of Shortness of breath and chest pressure    SUBJECTIVE / OVERNIGHT EVENTS:    No complaints today. No chest pain, no SOB.  Asking to leave immediately because was told by cardiologist is ok    MEDICATIONS  (STANDING):  aspirin enteric coated 81 milliGRAM(s) Oral daily  lisinopril 20 milliGRAM(s) Oral daily  simvastatin 20 milliGRAM(s) Oral at bedtime  clopidogrel Tablet 75 milliGRAM(s) Oral daily  carvedilol 3.125 milliGRAM(s) Oral every 12 hours  hydrALAZINE Injectable 5 milliGRAM(s) IV Push once    MEDICATIONS  (PRN):  acetaminophen   Tablet. 650 milliGRAM(s) Oral every 6 hours PRN milld to moderate pain 1-6    T(C): 36.6 (17 @ 05:21), Max: 36.6 (17 @ 05:21)  HR: 55 (17 @ 05:21) (55 - 60)  BP: 136/68 (17 @ 05:21) (136/68 - 211/99)  RR: 18 (17 @ 05:21) (18 - 18)  SpO2: 100% (17 @ 05:21)     PHYSICAL EXAM:  GENERAL: NAD, well-developed  HEAD:  Atraumatic, Normocephalic  CHEST/LUNG: Clear to auscultation bilaterally; No wheeze  HEART: Regular rate and rhythm; No murmurs, rubs, or gallops  ABDOMEN: Soft, Nontender, Nondistended; Bowel sounds present  EXTREMITIES:   warm and well perfused, No clubbing, cyanosis, or edema  PSYCH: AAOx3  NEUROLOGY: non-focal  SKIN: No rashes or lesions    LABS:                        14.4   4.65  )-----------( 134      ( 01 Aug 2017 07:42 )             42.4     08-    141  |  104  |  24<H>  ----------------------------<  92  4.7   |  22  |  1.21    Ca    9.1      01 Aug 2017 07:42    TPro  7.3  /  Alb  4.2  /  TBili  0.5  /  DBili  x   /  AST  15  /  ALT  13  /  AlkPhos  57  07-31    PT/INR - ( 2017 11:10 )   PT: 11.4 SEC;   INR: 1.02          PTT - ( 2017 11:10 )  PTT:30.6 SEC  CARDIAC MARKERS ( 2017 11:10 )  x     / < 0.06 ng/mL / 130 u/L / x     / x          Urinalysis Basic - ( 2017 13:30 )    Color: PLYEL / Appearance: CLEAR / S.012 / pH: 6.5  Gluc: NEGATIVE / Ketone: NEGATIVE  / Bili: NEGATIVE / Urobili: NORMAL E.U.   Blood: NEGATIVE / Protein: NEGATIVE / Nitrite: NEGATIVE   Leuk Esterase: NEGATIVE / RBC: 0-2 / WBC 0-2   Sq Epi: OCC / Non Sq Epi: x / Bacteria: x        Microbiology:   [facility]  @ 11:10  pH: 7.37/pCO2: 47/pO2: 43/HCO3: 25/lactate: 1.2      Cath: patent graft, no intervention     Consultant(s) Notes Reviewed:  cards    Care Discussed with Consultants/Other Providers: beth CHANG

## 2017-08-01 NOTE — PROGRESS NOTE ADULT - PROBLEM SELECTOR PLAN 1
s/p cath no intervention  seen by cards, stable for dc home  f/u with OP PCP to monitor renal fxn s/p cath no intervention  seen by cards, stable for dc home

## 2017-08-01 NOTE — PROGRESS NOTE ADULT - ASSESSMENT
81M with CAD s/p Iv CABG 2000 , AVNRT ablation, HTN presents with typical chest pain, new for three weeks s/p cath with stable disease, no intervention.

## 2017-08-01 NOTE — PROGRESS NOTE ADULT - ASSESSMENT
81M with CAD , CABG 2000 , AVNRT ablation, HTN. Patient presents with typical chest pain, new for three weeks. [facility] with CAD not amenable to intervention.     CAD  - medical mgmt  - to go home on DAPT, high dose statin (Atorva 40 or 80), BB (carvedilol), ACE (lisinopril)  - outpt cardiology f/u in 2-4 weeks.  PMD f/u in 1 week  - no objection to dc from cardiac perspective.  - general cardiology consult to continue to follow  - thank you for this interesting consult.   - For any questions please call [phone]

## 2017-11-01 ENCOUNTER — RX RENEWAL (OUTPATIENT)
Age: 82
End: 2017-11-01

## 2018-02-05 ENCOUNTER — APPOINTMENT (OUTPATIENT)
Dept: CARDIOLOGY | Facility: CLINIC | Age: 83
End: 2018-02-05
Payer: MEDICARE

## 2018-02-05 VITALS
HEIGHT: 71 IN | SYSTOLIC BLOOD PRESSURE: 170 MMHG | DIASTOLIC BLOOD PRESSURE: 91 MMHG | RESPIRATION RATE: 16 BRPM | WEIGHT: 207 LBS | OXYGEN SATURATION: 98 % | BODY MASS INDEX: 28.98 KG/M2 | HEART RATE: 58 BPM

## 2018-02-05 PROCEDURE — 93000 ELECTROCARDIOGRAM COMPLETE: CPT

## 2018-02-05 PROCEDURE — 99215 OFFICE O/P EST HI 40 MIN: CPT

## 2018-02-14 ENCOUNTER — APPOINTMENT (OUTPATIENT)
Dept: CV DIAGNOSTICS | Facility: HOSPITAL | Age: 83
End: 2018-02-14

## 2018-02-20 ENCOUNTER — APPOINTMENT (OUTPATIENT)
Dept: CV DIAGNOSTICS | Facility: HOSPITAL | Age: 83
End: 2018-02-20

## 2018-02-20 ENCOUNTER — APPOINTMENT (OUTPATIENT)
Dept: CV DIAGNOSITCS | Facility: HOSPITAL | Age: 83
End: 2018-02-20

## 2018-03-14 ENCOUNTER — NON-APPOINTMENT (OUTPATIENT)
Age: 83
End: 2018-03-14

## 2018-03-14 ENCOUNTER — APPOINTMENT (OUTPATIENT)
Dept: PULMONOLOGY | Facility: CLINIC | Age: 83
End: 2018-03-14
Payer: MEDICARE

## 2018-03-14 VITALS
OXYGEN SATURATION: 96 % | WEIGHT: 205 LBS | BODY MASS INDEX: 28.7 KG/M2 | TEMPERATURE: 97.7 F | HEIGHT: 71 IN | SYSTOLIC BLOOD PRESSURE: 190 MMHG | HEART RATE: 58 BPM | RESPIRATION RATE: 18 BRPM | DIASTOLIC BLOOD PRESSURE: 100 MMHG

## 2018-03-14 PROCEDURE — 99215 OFFICE O/P EST HI 40 MIN: CPT

## 2018-03-26 ENCOUNTER — APPOINTMENT (OUTPATIENT)
Dept: DERMATOLOGY | Facility: CLINIC | Age: 83
End: 2018-03-26

## 2018-03-28 ENCOUNTER — APPOINTMENT (OUTPATIENT)
Dept: CV DIAGNOSITCS | Facility: HOSPITAL | Age: 83
End: 2018-03-28

## 2018-03-28 ENCOUNTER — APPOINTMENT (OUTPATIENT)
Dept: CV DIAGNOSTICS | Facility: HOSPITAL | Age: 83
End: 2018-03-28

## 2018-05-29 ENCOUNTER — MEDICATION RENEWAL (OUTPATIENT)
Age: 83
End: 2018-05-29

## 2018-06-04 ENCOUNTER — RX RENEWAL (OUTPATIENT)
Age: 83
End: 2018-06-04

## 2018-06-20 ENCOUNTER — APPOINTMENT (OUTPATIENT)
Dept: CV DIAGNOSITCS | Facility: HOSPITAL | Age: 83
End: 2018-06-20

## 2018-06-20 ENCOUNTER — APPOINTMENT (OUTPATIENT)
Dept: CV DIAGNOSTICS | Facility: HOSPITAL | Age: 83
End: 2018-06-20

## 2018-07-31 ENCOUNTER — APPOINTMENT (OUTPATIENT)
Dept: CARDIOLOGY | Facility: CLINIC | Age: 83
End: 2018-07-31
Payer: MEDICARE

## 2018-07-31 ENCOUNTER — NON-APPOINTMENT (OUTPATIENT)
Age: 83
End: 2018-07-31

## 2018-07-31 VITALS
WEIGHT: 205 LBS | OXYGEN SATURATION: 96 % | SYSTOLIC BLOOD PRESSURE: 160 MMHG | DIASTOLIC BLOOD PRESSURE: 84 MMHG | HEART RATE: 57 BPM | RESPIRATION RATE: 16 BRPM | HEIGHT: 71 IN | BODY MASS INDEX: 28.7 KG/M2

## 2018-07-31 DIAGNOSIS — R05 COUGH: ICD-10-CM

## 2018-07-31 PROCEDURE — 93000 ELECTROCARDIOGRAM COMPLETE: CPT

## 2018-07-31 PROCEDURE — 99215 OFFICE O/P EST HI 40 MIN: CPT

## 2018-08-01 LAB
ALBUMIN SERPL ELPH-MCNC: 4.8 G/DL
ALP BLD-CCNC: 61 U/L
ALT SERPL-CCNC: 10 U/L
ANION GAP SERPL CALC-SCNC: 14 MMOL/L
AST SERPL-CCNC: 16 U/L
BASOPHILS # BLD AUTO: 0.01 K/UL
BASOPHILS NFR BLD AUTO: 0.2 %
BILIRUB SERPL-MCNC: 0.6 MG/DL
BUN SERPL-MCNC: 37 MG/DL
CALCIUM SERPL-MCNC: 9.8 MG/DL
CHLORIDE SERPL-SCNC: 104 MMOL/L
CHOLEST SERPL-MCNC: 186 MG/DL
CHOLEST/HDLC SERPL: 3.4 RATIO
CO2 SERPL-SCNC: 24 MMOL/L
CREAT SERPL-MCNC: 1.5 MG/DL
EOSINOPHIL # BLD AUTO: 0.09 K/UL
EOSINOPHIL NFR BLD AUTO: 1.5 %
GLUCOSE SERPL-MCNC: 72 MG/DL
HBA1C MFR BLD HPLC: 5.6 %
HCT VFR BLD CALC: 41.3 %
HDLC SERPL-MCNC: 55 MG/DL
HGB BLD-MCNC: 13.8 G/DL
IMM GRANULOCYTES NFR BLD AUTO: 0.2 %
LDLC SERPL CALC-MCNC: 115 MG/DL
LYMPHOCYTES # BLD AUTO: 0.87 K/UL
LYMPHOCYTES NFR BLD AUTO: 14.5 %
MAN DIFF?: NORMAL
MCHC RBC-ENTMCNC: 31.2 PG
MCHC RBC-ENTMCNC: 33.4 GM/DL
MCV RBC AUTO: 93.2 FL
MONOCYTES # BLD AUTO: 0.56 K/UL
MONOCYTES NFR BLD AUTO: 9.3 %
NEUTROPHILS # BLD AUTO: 4.45 K/UL
NEUTROPHILS NFR BLD AUTO: 74.3 %
PLATELET # BLD AUTO: 142 K/UL
POTASSIUM SERPL-SCNC: 4.4 MMOL/L
PROT SERPL-MCNC: 8 G/DL
RBC # BLD: 4.43 M/UL
RBC # FLD: 13.4 %
SODIUM SERPL-SCNC: 142 MMOL/L
TRIGL SERPL-MCNC: 78 MG/DL
TSH SERPL-ACNC: 0.4 UIU/ML
WBC # FLD AUTO: 5.99 K/UL

## 2018-08-02 RX ORDER — CHLORTHALIDONE 25 MG/1
25 TABLET ORAL
Qty: 90 | Refills: 3 | Status: DISCONTINUED | COMMUNITY
Start: 2018-03-14 | End: 2018-08-02

## 2018-08-06 PROBLEM — M62.830 MUSCLE SPASM OF BACK: Chronic | Status: ACTIVE | Noted: 2017-07-31

## 2018-08-06 PROBLEM — E78.5 HYPERLIPIDEMIA, UNSPECIFIED: Chronic | Status: ACTIVE | Noted: 2017-07-31

## 2018-08-17 ENCOUNTER — APPOINTMENT (OUTPATIENT)
Dept: CV DIAGNOSTICS | Facility: HOSPITAL | Age: 83
End: 2018-08-17
Payer: MEDICARE

## 2018-08-17 ENCOUNTER — OUTPATIENT (OUTPATIENT)
Dept: OUTPATIENT SERVICES | Facility: HOSPITAL | Age: 83
LOS: 1 days | End: 2018-08-17

## 2018-08-17 DIAGNOSIS — Z00.00 ENCOUNTER FOR GENERAL ADULT MEDICAL EXAMINATION WITHOUT ABNORMAL FINDINGS: ICD-10-CM

## 2018-08-17 DIAGNOSIS — Z95.1 PRESENCE OF AORTOCORONARY BYPASS GRAFT: Chronic | ICD-10-CM

## 2018-08-17 PROCEDURE — 78452 HT MUSCLE IMAGE SPECT MULT: CPT | Mod: 26

## 2018-08-17 PROCEDURE — 93016 CV STRESS TEST SUPVJ ONLY: CPT | Mod: GC

## 2018-08-17 PROCEDURE — 93018 CV STRESS TEST I&R ONLY: CPT | Mod: GC

## 2018-08-23 ENCOUNTER — RX RENEWAL (OUTPATIENT)
Age: 83
End: 2018-08-23

## 2018-12-15 ENCOUNTER — TRANSCRIPTION ENCOUNTER (OUTPATIENT)
Age: 83
End: 2018-12-15

## 2018-12-27 ENCOUNTER — TRANSCRIPTION ENCOUNTER (OUTPATIENT)
Age: 83
End: 2018-12-27

## 2019-01-07 ENCOUNTER — TRANSCRIPTION ENCOUNTER (OUTPATIENT)
Age: 84
End: 2019-01-07

## 2019-02-25 ENCOUNTER — MEDICATION RENEWAL (OUTPATIENT)
Age: 84
End: 2019-02-25

## 2019-02-27 ENCOUNTER — MEDICATION RENEWAL (OUTPATIENT)
Age: 84
End: 2019-02-27

## 2019-03-12 ENCOUNTER — APPOINTMENT (OUTPATIENT)
Dept: CARDIOLOGY | Facility: CLINIC | Age: 84
End: 2019-03-12
Payer: MEDICARE

## 2019-03-12 VITALS
HEART RATE: 52 BPM | HEIGHT: 71 IN | BODY MASS INDEX: 28.35 KG/M2 | SYSTOLIC BLOOD PRESSURE: 177 MMHG | WEIGHT: 202.5 LBS | DIASTOLIC BLOOD PRESSURE: 88 MMHG | OXYGEN SATURATION: 98 %

## 2019-03-12 PROCEDURE — 93000 ELECTROCARDIOGRAM COMPLETE: CPT

## 2019-03-12 PROCEDURE — 99214 OFFICE O/P EST MOD 30 MIN: CPT

## 2019-03-13 NOTE — ASSESSMENT
[FreeTextEntry1] : 84 yo male with \par CAD - s/p small MI with PCI in past - last stress test showed small wall motion abnormality and no inducible ischemia. Continue DAPT , BBL ( clirified toprol xl dose is 50 mg daily - not 25) and ARB. To report any new symptoms.\par HTN - SBP a bit high but her was likely mistaking his BBL dose. Will take as we discussed and repoeat ambi BPs.\par Lipids - on high dose statin

## 2019-03-13 NOTE — REASON FOR VISIT
[FreeTextEntry1] : 84 yo male with CAD, HTN , lyperlipidemia, SVT and hx of pulm dz.\par Here for check up. He has been under significant stress in setting of daughter's brain CA resulting in cognitive impairment.\par \par he has been adherent with his meds but he believes that he is taking his BBL incorrectly ( too low a dose). and reports that he has had some elevated BPs.\par \par He denies CP, FALLON or palpitations. Nobleeding or bruising on his DAPT.\par \par

## 2019-03-13 NOTE — PHYSICAL EXAM
[General Appearance - Well Developed] : well developed [Normal Appearance] : normal appearance [Well Groomed] : well groomed [General Appearance - Well Nourished] : well nourished [No Deformities] : no deformities [General Appearance - In No Acute Distress] : no acute distress [Normal Conjunctiva] : the conjunctiva exhibited no abnormalities [Eyelids - No Xanthelasma] : the eyelids demonstrated no xanthelasmas [Normal Oral Mucosa] : normal oral mucosa [No Oral Pallor] : no oral pallor [No Oral Cyanosis] : no oral cyanosis [Normal Jugular Venous A Waves Present] : normal jugular venous A waves present [Normal Jugular Venous V Waves Present] : normal jugular venous V waves present [No Jugular Venous Case A Waves] : no jugular venous case A waves [Respiration, Rhythm And Depth] : normal respiratory rhythm and effort [Exaggerated Use Of Accessory Muscles For Inspiration] : no accessory muscle use [Auscultation Breath Sounds / Voice Sounds] : lungs were clear to auscultation bilaterally [Normal] : normal [No Precordial Heave] : no precordial heave was noted [Normal Rate] : normal [Rhythm Regular] : regular [Normal S1] : normal S1 [Normal S2] : normal S2 [No Gallop] : no gallop heard [Click] : no click [II] : a grade 2 [Right Carotid Bruit] : no bruit heard over the right carotid [Left Carotid Bruit] : no bruit heard over the left carotid [2+] : left 2+ [No Abnormalities] : the abdominal aorta was not enlarged and no bruit was heard [No Pitting Edema] : no pitting edema present [Rt] : no varicose veins of the right leg [Lt] : no varicose veins of the left leg [Abdomen Soft] : soft [Abdomen Tenderness] : non-tender [Abdomen Mass (___ Cm)] : no abdominal mass palpated [Abnormal Walk] : normal gait [Gait - Sufficient For Exercise Testing] : the gait was sufficient for exercise testing [Nail Clubbing] : no clubbing of the fingernails [Cyanosis, Localized] : no localized cyanosis [Petechial Hemorrhages (___cm)] : no petechial hemorrhages [Skin Color & Pigmentation] : normal skin color and pigmentation [] : no rash [No Venous Stasis] : no venous stasis [Skin Lesions] : no skin lesions [No Skin Ulcers] : no skin ulcer [No Xanthoma] : no  xanthoma was observed [Oriented To Time, Place, And Person] : oriented to person, place, and time [Affect] : the affect was normal [Mood] : the mood was normal [No Anxiety] : not feeling anxious

## 2019-03-21 LAB
ALBUMIN SERPL ELPH-MCNC: 4.4 G/DL
ALP BLD-CCNC: 63 U/L
ALT SERPL-CCNC: 12 U/L
ANION GAP SERPL CALC-SCNC: 15 MMOL/L
AST SERPL-CCNC: 18 U/L
BASOPHILS # BLD AUTO: 0.02 K/UL
BASOPHILS NFR BLD AUTO: 0.4 %
BILIRUB SERPL-MCNC: 0.3 MG/DL
BUN SERPL-MCNC: 38 MG/DL
CALCIUM SERPL-MCNC: 10 MG/DL
CHLORIDE SERPL-SCNC: 105 MMOL/L
CHOLEST SERPL-MCNC: 168 MG/DL
CHOLEST/HDLC SERPL: 2.9 RATIO
CO2 SERPL-SCNC: 23 MMOL/L
CREAT SERPL-MCNC: 1.65 MG/DL
EOSINOPHIL # BLD AUTO: 0.08 K/UL
EOSINOPHIL NFR BLD AUTO: 1.5 %
GLUCOSE SERPL-MCNC: 59 MG/DL
HBA1C MFR BLD HPLC: 5.9 %
HCT VFR BLD CALC: 41.7 %
HDLC SERPL-MCNC: 58 MG/DL
HGB BLD-MCNC: 13.4 G/DL
IMM GRANULOCYTES NFR BLD AUTO: 0.2 %
LDLC SERPL CALC-MCNC: 94 MG/DL
LYMPHOCYTES # BLD AUTO: 0.76 K/UL
LYMPHOCYTES NFR BLD AUTO: 14.3 %
MAN DIFF?: NORMAL
MCHC RBC-ENTMCNC: 30.8 PG
MCHC RBC-ENTMCNC: 32.1 GM/DL
MCV RBC AUTO: 95.9 FL
MONOCYTES # BLD AUTO: 0.38 K/UL
MONOCYTES NFR BLD AUTO: 7.1 %
NEUTROPHILS # BLD AUTO: 4.08 K/UL
NEUTROPHILS NFR BLD AUTO: 76.5 %
PLATELET # BLD AUTO: 142 K/UL
POTASSIUM SERPL-SCNC: 5.2 MMOL/L
PROT SERPL-MCNC: 7.8 G/DL
RBC # BLD: 4.35 M/UL
RBC # FLD: 13.2 %
SODIUM SERPL-SCNC: 143 MMOL/L
TRIGL SERPL-MCNC: 82 MG/DL
WBC # FLD AUTO: 5.33 K/UL

## 2019-08-09 ENCOUNTER — RX RENEWAL (OUTPATIENT)
Age: 84
End: 2019-08-09

## 2019-12-17 ENCOUNTER — NON-APPOINTMENT (OUTPATIENT)
Age: 84
End: 2019-12-17

## 2019-12-17 ENCOUNTER — APPOINTMENT (OUTPATIENT)
Dept: CARDIOLOGY | Facility: CLINIC | Age: 84
End: 2019-12-17
Payer: MEDICARE

## 2019-12-17 VITALS
OXYGEN SATURATION: 98 % | SYSTOLIC BLOOD PRESSURE: 174 MMHG | BODY MASS INDEX: 29.85 KG/M2 | HEIGHT: 71 IN | HEART RATE: 58 BPM | DIASTOLIC BLOOD PRESSURE: 104 MMHG

## 2019-12-17 VITALS — WEIGHT: 214 LBS | BODY MASS INDEX: 29.85 KG/M2

## 2019-12-17 VITALS — DIASTOLIC BLOOD PRESSURE: 83 MMHG | SYSTOLIC BLOOD PRESSURE: 169 MMHG

## 2019-12-17 PROCEDURE — 99214 OFFICE O/P EST MOD 30 MIN: CPT

## 2019-12-18 LAB
ALBUMIN SERPL ELPH-MCNC: 4.6 G/DL
ALP BLD-CCNC: 62 U/L
ALT SERPL-CCNC: 15 U/L
ANION GAP SERPL CALC-SCNC: 14 MMOL/L
AST SERPL-CCNC: 20 U/L
BASOPHILS # BLD AUTO: 0.02 K/UL
BASOPHILS NFR BLD AUTO: 0.3 %
BILIRUB SERPL-MCNC: 0.4 MG/DL
BUN SERPL-MCNC: 34 MG/DL
CALCIUM SERPL-MCNC: 9.8 MG/DL
CHLORIDE SERPL-SCNC: 100 MMOL/L
CHOLEST SERPL-MCNC: 198 MG/DL
CHOLEST/HDLC SERPL: 3.4 RATIO
CO2 SERPL-SCNC: 26 MMOL/L
CREAT SERPL-MCNC: 1.6 MG/DL
EOSINOPHIL # BLD AUTO: 0.09 K/UL
EOSINOPHIL NFR BLD AUTO: 1.5 %
ESTIMATED AVERAGE GLUCOSE: 117 MG/DL
GLUCOSE SERPL-MCNC: 41 MG/DL
HBA1C MFR BLD HPLC: 5.7 %
HCT VFR BLD CALC: 44.1 %
HDLC SERPL-MCNC: 59 MG/DL
HGB BLD-MCNC: 14.1 G/DL
IMM GRANULOCYTES NFR BLD AUTO: 0.2 %
LDLC SERPL CALC-MCNC: 122 MG/DL
LYMPHOCYTES # BLD AUTO: 0.82 K/UL
LYMPHOCYTES NFR BLD AUTO: 13.5 %
MAN DIFF?: NORMAL
MCHC RBC-ENTMCNC: 30.7 PG
MCHC RBC-ENTMCNC: 32 GM/DL
MCV RBC AUTO: 95.9 FL
MONOCYTES # BLD AUTO: 0.44 K/UL
MONOCYTES NFR BLD AUTO: 7.2 %
NEUTROPHILS # BLD AUTO: 4.71 K/UL
NEUTROPHILS NFR BLD AUTO: 77.3 %
PLATELET # BLD AUTO: 159 K/UL
POTASSIUM SERPL-SCNC: 5 MMOL/L
PROT SERPL-MCNC: 7.9 G/DL
RBC # BLD: 4.6 M/UL
RBC # FLD: 14 %
SODIUM SERPL-SCNC: 140 MMOL/L
TRIGL SERPL-MCNC: 87 MG/DL
TSH SERPL-ACNC: 0.89 UIU/ML
WBC # FLD AUTO: 6.09 K/UL

## 2019-12-18 NOTE — REASON FOR VISIT
[FreeTextEntry1] : 83 year old male with CAD, HTN, hyperlipidemia, SVT, and hx of pulm dz. \par Here for check up. He has been under significant stress in setting of daughter's brain CA resulting in cognitive impairment.\par \par he has been adherent with his meds but he believes that he is taking his BBL incorrectly ( too low a dose). and reports that he has had some elevated BPs.\par \par He denies CP, FALLON or palpitations. No bleeding or bruising on his DAPT.\par \par Interval Note\par December 17, 2019\par \par Patient returns for a routine cardiac follow up. Blood pressure today is extremely elevated. EKG is in sinus bradycardia (on BB) which is stable and unchanged T wave abnormalities.\par \par Most recent nuclear stress test was performed on August 2018 which demonstrated 4 METS of activity which was poor for his age and gender; nuclear portion indicated a large, severe defect in the anterolateral wall that is fixed, suggestive of infarct. No change when compared to study from May 2016.\par with LVEF of 42%.\par \par Most recent echocardiogram performed in April of 2017. Endocardiogram demonstrated grossly mild to moderate segmental LV systolic dysfunction. 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

## 2019-12-18 NOTE — PHYSICAL EXAM
[General Appearance - Well Developed] : well developed [Well Groomed] : well groomed [Normal Appearance] : normal appearance [General Appearance - Well Nourished] : well nourished [No Deformities] : no deformities [General Appearance - In No Acute Distress] : no acute distress [Normal Conjunctiva] : the conjunctiva exhibited no abnormalities [Eyelids - No Xanthelasma] : the eyelids demonstrated no xanthelasmas [Normal Oral Mucosa] : normal oral mucosa [No Oral Cyanosis] : no oral cyanosis [No Oral Pallor] : no oral pallor [Normal Jugular Venous V Waves Present] : normal jugular venous V waves present [Normal Jugular Venous A Waves Present] : normal jugular venous A waves present [No Jugular Venous Case A Waves] : no jugular venous case A waves [Respiration, Rhythm And Depth] : normal respiratory rhythm and effort [Exaggerated Use Of Accessory Muscles For Inspiration] : no accessory muscle use [Abdomen Soft] : soft [Auscultation Breath Sounds / Voice Sounds] : lungs were clear to auscultation bilaterally [Abdomen Tenderness] : non-tender [Abdomen Mass (___ Cm)] : no abdominal mass palpated [Gait - Sufficient For Exercise Testing] : the gait was sufficient for exercise testing [Abnormal Walk] : normal gait [Petechial Hemorrhages (___cm)] : no petechial hemorrhages [Nail Clubbing] : no clubbing of the fingernails [Cyanosis, Localized] : no localized cyanosis [Skin Color & Pigmentation] : normal skin color and pigmentation [] : no rash [No Venous Stasis] : no venous stasis [Skin Lesions] : no skin lesions [No Xanthoma] : no  xanthoma was observed [No Skin Ulcers] : no skin ulcer [Oriented To Time, Place, And Person] : oriented to person, place, and time [Affect] : the affect was normal [Mood] : the mood was normal [No Anxiety] : not feeling anxious [Normal] : normal [No Precordial Heave] : no precordial heave was noted [Rhythm Regular] : regular [Normal S1] : normal S1 [Normal S2] : normal S2 [No Gallop] : no gallop heard [II] : a grade 2 [No Abnormalities] : the abdominal aorta was not enlarged and no bruit was heard [2+] : left 2+ [No Pitting Edema] : no pitting edema present [Bradycardia] : bradycardic [FreeTextEntry1] : trace swelling [Click] : no click [Right Carotid Bruit] : no bruit heard over the right carotid [Left Carotid Bruit] : no bruit heard over the left carotid [Rt] : no varicose veins of the right leg [Lt] : no varicose veins of the left leg

## 2019-12-18 NOTE — ASSESSMENT
[FreeTextEntry1] : 83 year old male with s/p thoracic aneurysm repair,  CAD, HTN, hyperlipidemia, SVT, and pulmonary disease.\par \par Blood pressure today remains elevated today on current \par regimen of :\par Lisinopril 20 mg daily\par Metoprolol ER 50 mg daily\par Spironolactone 25 mg QD\par \par Will increase Lisinopril to 40 mg daily\par Follow up BP log at home\par \par CAD\par *Repeat echocardiogram to reassess cardiac function prior to\par surgical procedure\par Continue on current medical regimen of:\par Aspirin 81 mg \par Plavix 75 mg QD\par Simvastatin 40 mg QHS\par \par complete blood work panel: CBC, CMP, Hgb A1C, TSH, Lipid panel\par \par *Repeat Chest CT for reevaluation of thoracic aneurysm\par \par Follow up in two months to reassess BP control. \par

## 2019-12-20 ENCOUNTER — APPOINTMENT (OUTPATIENT)
Dept: CT IMAGING | Facility: CLINIC | Age: 84
End: 2019-12-20
Payer: MEDICARE

## 2019-12-20 ENCOUNTER — OUTPATIENT (OUTPATIENT)
Dept: OUTPATIENT SERVICES | Facility: HOSPITAL | Age: 84
LOS: 1 days | End: 2019-12-20
Payer: MEDICARE

## 2019-12-20 DIAGNOSIS — I71.2 THORACIC AORTIC ANEURYSM, WITHOUT RUPTURE: ICD-10-CM

## 2019-12-20 DIAGNOSIS — I25.10 ATHEROSCLEROTIC HEART DISEASE OF NATIVE CORONARY ARTERY WITHOUT ANGINA PECTORIS: ICD-10-CM

## 2019-12-20 DIAGNOSIS — Z95.1 PRESENCE OF AORTOCORONARY BYPASS GRAFT: Chronic | ICD-10-CM

## 2019-12-20 PROCEDURE — 71250 CT THORAX DX C-: CPT

## 2019-12-20 PROCEDURE — 71250 CT THORAX DX C-: CPT | Mod: 26

## 2020-02-18 ENCOUNTER — OUTPATIENT (OUTPATIENT)
Dept: OUTPATIENT SERVICES | Facility: HOSPITAL | Age: 85
LOS: 1 days | End: 2020-02-18

## 2020-02-18 ENCOUNTER — APPOINTMENT (OUTPATIENT)
Dept: CARDIOLOGY | Facility: CLINIC | Age: 85
End: 2020-02-18
Payer: MEDICARE

## 2020-02-18 ENCOUNTER — APPOINTMENT (OUTPATIENT)
Dept: CV DIAGNOSITCS | Facility: HOSPITAL | Age: 85
End: 2020-02-18
Payer: MEDICARE

## 2020-02-18 VITALS
DIASTOLIC BLOOD PRESSURE: 87 MMHG | SYSTOLIC BLOOD PRESSURE: 170 MMHG | BODY MASS INDEX: 29.96 KG/M2 | HEIGHT: 71 IN | HEART RATE: 56 BPM | OXYGEN SATURATION: 98 % | RESPIRATION RATE: 16 BRPM | WEIGHT: 214 LBS

## 2020-02-18 DIAGNOSIS — I25.10 ATHEROSCLEROTIC HEART DISEASE OF NATIVE CORONARY ARTERY WITHOUT ANGINA PECTORIS: ICD-10-CM

## 2020-02-18 DIAGNOSIS — I71.2 THORACIC AORTIC ANEURYSM, WITHOUT RUPTURE: ICD-10-CM

## 2020-02-18 DIAGNOSIS — Z95.1 PRESENCE OF AORTOCORONARY BYPASS GRAFT: Chronic | ICD-10-CM

## 2020-02-18 PROCEDURE — 99214 OFFICE O/P EST MOD 30 MIN: CPT

## 2020-02-18 PROCEDURE — 93306 TTE W/DOPPLER COMPLETE: CPT | Mod: 26

## 2020-02-18 PROCEDURE — 93000 ELECTROCARDIOGRAM COMPLETE: CPT

## 2020-02-18 NOTE — REASON FOR VISIT
[FreeTextEntry1] : 83 year old male with CAD, HTN, hyperlipidemia, SVT, and hx of pulm dz. \par Here for check up. He has been under significant stress in setting of daughter's brain CA resulting in cognitive impairment.\par \par he has been adherent with his meds but he believes that he is taking his BBL incorrectly ( too low a dose). and reports that he has had some elevated BPs.\par \par He denies CP, FALLON or palpitations. No bleeding or bruising on his DAPT.\par \par Interval Note \par December 17, 2019\par \par Patient returns for a routine cardiac follow up. Blood pressure today is extremely elevated. EKG is in sinus bradycardia (on BB) which is stable and unchanged T wave abnormalities.\par \par Most recent nuclear stress test was performed on August 2018 which demonstrated 4 METS of activity which was poor for his age and gender; nuclear portion indicated a large, severe defect in the anterolateral wall that is fixed, suggestive of infarct. No change when compared to study from May 2016.\par with LVEF of 42%.\par \par Most recent echocardiogram performed in April of 2017. Endocardiogram demonstrated grossly mild to moderate segmental LV systolic dysfunction. 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  [facility] and is [facility] for a check up.\par \par He feels " great" overall. Daughter is in remission from brain CA and living with him.\par \par He has checked BP a few times and reports " elevated top number"\par \par

## 2020-02-18 NOTE — ASSESSMENT
[FreeTextEntry1] : 83 year old male with s/p thoracic aneurysm repair,  CAD, HTN, hyperlipidemia, SVT, and pulmonary disease.\par \par BP remains elevated today and when we reviewed his meds - his list has lower doses of Lisinopril and Metoprolol. I wrote new doses for him and he will call later when he gets home and confirm what dose he is actually taking.\par \par CAD\par *reviewed todays echo - segmental WMA unchanged. Continue current meds. Again - will confirm dose of BBL and ACEI.\par \par Reviewed labs from Dec - hgba1c is acceltable. LDL still over 100. Will change statin dose\par \par *Repeat Chest CT for reevaluation of thoracic aneurysm - reviewed report  Mild dilation localized to SOV. NO change from previous study.\par \par Follow up in two months to reassess BP control. \par

## 2020-02-18 NOTE — PHYSICAL EXAM
[General Appearance - Well Developed] : well developed [Normal Appearance] : normal appearance [Well Groomed] : well groomed [General Appearance - Well Nourished] : well nourished [General Appearance - In No Acute Distress] : no acute distress [No Deformities] : no deformities [Eyelids - No Xanthelasma] : the eyelids demonstrated no xanthelasmas [Normal Conjunctiva] : the conjunctiva exhibited no abnormalities [No Oral Pallor] : no oral pallor [Normal Oral Mucosa] : normal oral mucosa [No Oral Cyanosis] : no oral cyanosis [Normal Jugular Venous A Waves Present] : normal jugular venous A waves present [Normal Jugular Venous V Waves Present] : normal jugular venous V waves present [No Jugular Venous Case A Waves] : no jugular venous case A waves [Respiration, Rhythm And Depth] : normal respiratory rhythm and effort [Exaggerated Use Of Accessory Muscles For Inspiration] : no accessory muscle use [Auscultation Breath Sounds / Voice Sounds] : lungs were clear to auscultation bilaterally [Abdomen Mass (___ Cm)] : no abdominal mass palpated [Abdomen Tenderness] : non-tender [Abdomen Soft] : soft [Gait - Sufficient For Exercise Testing] : the gait was sufficient for exercise testing [Abnormal Walk] : normal gait [Nail Clubbing] : no clubbing of the fingernails [Petechial Hemorrhages (___cm)] : no petechial hemorrhages [Cyanosis, Localized] : no localized cyanosis [Skin Color & Pigmentation] : normal skin color and pigmentation [] : no rash [Skin Lesions] : no skin lesions [No Skin Ulcers] : no skin ulcer [No Venous Stasis] : no venous stasis [Oriented To Time, Place, And Person] : oriented to person, place, and time [No Xanthoma] : no  xanthoma was observed [Mood] : the mood was normal [Affect] : the affect was normal [Normal] : normal [No Anxiety] : not feeling anxious [No Precordial Heave] : no precordial heave was noted [Rhythm Regular] : regular [Normal S2] : normal S2 [Normal S1] : normal S1 [II] : a grade 2 [No Gallop] : no gallop heard [No Abnormalities] : the abdominal aorta was not enlarged and no bruit was heard [2+] : left 2+ [No Pitting Edema] : no pitting edema present [Normal Rate] : normal [FreeTextEntry1] : trace swelling [Click] : no click [Right Carotid Bruit] : no bruit heard over the right carotid [Left Carotid Bruit] : no bruit heard over the left carotid [Rt] : no varicose veins of the right leg [Lt] : no varicose veins of the left leg

## 2020-09-29 NOTE — PATIENT PROFILE ADULT. - CENTRAL VENOUS CATHETER
----- Message from Marah Jeffrey DO sent at 9/29/2020  1:51 PM EDT -----  Lipid levels unremarkable  Hepatic function unremarkable  Continue statin medication  Uric acid levels are elevated  This helps confirm a diagnosis of gout  I recommend patient start allopurinol daily as this will help lower uric acid levels and prevent recurrence of gout  If she is agreeable I will fax in the medication  no

## 2021-02-05 NOTE — PROGRESS NOTE ADULT - ATTENDING COMMENTS
----- Message from Juanita Bell MD sent at 2/5/2021  3:28 PM CST -----  Normal results please call patient.   stable for dispo home today

## 2021-06-02 ENCOUNTER — TRANSCRIPTION ENCOUNTER (OUTPATIENT)
Age: 86
End: 2021-06-02

## 2021-06-22 ENCOUNTER — APPOINTMENT (OUTPATIENT)
Dept: CARDIOLOGY | Facility: CLINIC | Age: 86
End: 2021-06-22
Payer: MEDICARE

## 2021-06-22 ENCOUNTER — NON-APPOINTMENT (OUTPATIENT)
Age: 86
End: 2021-06-22

## 2021-06-22 VITALS
HEART RATE: 55 BPM | DIASTOLIC BLOOD PRESSURE: 92 MMHG | SYSTOLIC BLOOD PRESSURE: 167 MMHG | HEIGHT: 71 IN | OXYGEN SATURATION: 98 % | BODY MASS INDEX: 28.45 KG/M2

## 2021-06-22 VITALS — TEMPERATURE: 97.4 F

## 2021-06-22 VITALS — BODY MASS INDEX: 28.45 KG/M2 | WEIGHT: 204 LBS

## 2021-06-22 DIAGNOSIS — I71.2 THORACIC AORTIC ANEURYSM, W/OUT RUPTURE: ICD-10-CM

## 2021-06-22 DIAGNOSIS — Z86.79 PERSONAL HISTORY OF OTHER DISEASES OF THE CIRCULATORY SYSTEM: ICD-10-CM

## 2021-06-22 PROCEDURE — 99072 ADDL SUPL MATRL&STAF TM PHE: CPT

## 2021-06-22 PROCEDURE — 99214 OFFICE O/P EST MOD 30 MIN: CPT

## 2021-06-22 PROCEDURE — 93000 ELECTROCARDIOGRAM COMPLETE: CPT

## 2021-06-22 NOTE — REASON FOR VISIT
[FreeTextEntry1] : 83 year old male with CAD, HTN, hyperlipidemia, SVT, and hx of pulm dz. \par Here for check up. He has been under significant stress in setting of daughter's brain CA resulting in cognitive impairment.\par \par he has been adherent with his meds but he believes that he is taking his BBL incorrectly ( too low a dose). and reports that he has had some elevated BPs.\par \par He denies CP, FALLON or palpitations. No bleeding or bruising on his DAPT.\par \par Interval Note \par December 17, 2019\par \par Patient returns for a routine cardiac follow up. Blood pressure today is extremely elevated. EKG is in sinus bradycardia (on BB) which is stable and unchanged T wave abnormalities.\par \par Most recent nuclear stress test was performed on August 2018 which demonstrated 4 METS of activity which was poor for his age and gender; nuclear portion indicated a large, severe defect in the anterolateral wall that is fixed, suggestive of infarct. No change when compared to study from May 2016.\par with LVEF of 42%.\par \par Most recent echocardiogram performed in April of 2017. Endocardiogram demonstrated grossly mild to moderate segmental LV systolic dysfunction. 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  [facility] and [facility] up.\par \par He feels " great" overall. Daughter is in remission from brain CA and living with him.\par \par \par \par Visit - 6/22/21\par \par Feeling well.\par \par Has been active . Had both Covid vaccines w little side effects.\par \par Reviewed med list - some confusion about dosing . Asked him to re-write his list.\par \par No CP, FALLON or palpitations. [facility].\par He has checked BP a few times and reports " elevated top number"\par \par  done

## 2021-06-22 NOTE — PHYSICAL EXAM
[General Appearance - Well Developed] : well developed [Normal Appearance] : normal appearance [Well Groomed] : well groomed [General Appearance - Well Nourished] : well nourished [No Deformities] : no deformities [General Appearance - In No Acute Distress] : no acute distress [Normal Conjunctiva] : the conjunctiva exhibited no abnormalities [Eyelids - No Xanthelasma] : the eyelids demonstrated no xanthelasmas [Normal Oral Mucosa] : normal oral mucosa [No Oral Pallor] : no oral pallor [No Oral Cyanosis] : no oral cyanosis [Normal Jugular Venous A Waves Present] : normal jugular venous A waves present [Normal Jugular Venous V Waves Present] : normal jugular venous V waves present [No Jugular Venous Case A Waves] : no jugular venous case A waves [Respiration, Rhythm And Depth] : normal respiratory rhythm and effort [Exaggerated Use Of Accessory Muscles For Inspiration] : no accessory muscle use [Auscultation Breath Sounds / Voice Sounds] : lungs were clear to auscultation bilaterally [Abdomen Soft] : soft [Abdomen Tenderness] : non-tender [Abdomen Mass (___ Cm)] : no abdominal mass palpated [Abnormal Walk] : normal gait [Gait - Sufficient For Exercise Testing] : the gait was sufficient for exercise testing [Nail Clubbing] : no clubbing of the fingernails [Cyanosis, Localized] : no localized cyanosis [Petechial Hemorrhages (___cm)] : no petechial hemorrhages [Skin Color & Pigmentation] : normal skin color and pigmentation [] : no rash [No Venous Stasis] : no venous stasis [Skin Lesions] : no skin lesions [No Skin Ulcers] : no skin ulcer [No Xanthoma] : no  xanthoma was observed [Oriented To Time, Place, And Person] : oriented to person, place, and time [Affect] : the affect was normal [Mood] : the mood was normal [No Anxiety] : not feeling anxious [Normal] : normal [No Precordial Heave] : no precordial heave was noted [Rhythm Regular] : regular [Normal Rate] : normal [Normal S1] : normal S1 [Normal S2] : normal S2 [No Gallop] : no gallop heard [II] : a grade 2 [2+] : left 2+ [No Abnormalities] : the abdominal aorta was not enlarged and no bruit was heard [No Pitting Edema] : no pitting edema present [FreeTextEntry1] : trace swelling [Click] : no click [Right Carotid Bruit] : no bruit heard over the right carotid [Left Carotid Bruit] : no bruit heard over the left carotid [Rt] : no varicose veins of the right leg [Lt] : no varicose veins of the left leg

## 2021-06-22 NOTE — ASSESSMENT
[FreeTextEntry1] : 83 year old male with s/p thoracic aneurysm repair,  CAD, HTN, hyperlipidemia, SVT, and pulmonary disease.\par \par HTN - a bit elevated but we need to confirm his meds. Asked him to check a few times weekly.\par \par CAD - reviewed last years echo with small WMA. Has not had stress in several years. Will schedule stress nuke .\par \par \par Lipids - no recent labs - labs drawn today. On mid dose statin\par \par

## 2021-06-24 LAB
ALBUMIN SERPL ELPH-MCNC: 4.6 G/DL
ALP BLD-CCNC: 66 U/L
ALT SERPL-CCNC: 13 U/L
ANION GAP SERPL CALC-SCNC: 15 MMOL/L
AST SERPL-CCNC: 19 U/L
BASOPHILS # BLD AUTO: 0.02 K/UL
BASOPHILS NFR BLD AUTO: 0.4 %
BILIRUB SERPL-MCNC: 0.4 MG/DL
BUN SERPL-MCNC: 33 MG/DL
CALCIUM SERPL-MCNC: 10.1 MG/DL
CHLORIDE SERPL-SCNC: 101 MMOL/L
CHOLEST SERPL-MCNC: 147 MG/DL
CO2 SERPL-SCNC: 22 MMOL/L
CREAT SERPL-MCNC: 1.62 MG/DL
EOSINOPHIL # BLD AUTO: 0.05 K/UL
EOSINOPHIL NFR BLD AUTO: 1 %
ESTIMATED AVERAGE GLUCOSE: 114 MG/DL
GLUCOSE SERPL-MCNC: 74 MG/DL
HBA1C MFR BLD HPLC: 5.6 %
HCT VFR BLD CALC: 41.6 %
HDLC SERPL-MCNC: 52 MG/DL
HGB BLD-MCNC: 13.7 G/DL
IMM GRANULOCYTES NFR BLD AUTO: 0.4 %
LDLC SERPL CALC-MCNC: 77 MG/DL
LYMPHOCYTES # BLD AUTO: 0.94 K/UL
LYMPHOCYTES NFR BLD AUTO: 18.7 %
MAN DIFF?: NORMAL
MCHC RBC-ENTMCNC: 31.1 PG
MCHC RBC-ENTMCNC: 32.9 GM/DL
MCV RBC AUTO: 94.5 FL
MONOCYTES # BLD AUTO: 0.42 K/UL
MONOCYTES NFR BLD AUTO: 8.4 %
NEUTROPHILS # BLD AUTO: 3.57 K/UL
NEUTROPHILS NFR BLD AUTO: 71.1 %
NONHDLC SERPL-MCNC: 95 MG/DL
PLATELET # BLD AUTO: 187 K/UL
POTASSIUM SERPL-SCNC: 5 MMOL/L
PROT SERPL-MCNC: 8.2 G/DL
RBC # BLD: 4.4 M/UL
RBC # FLD: 13.2 %
SODIUM SERPL-SCNC: 138 MMOL/L
TRIGL SERPL-MCNC: 91 MG/DL
TSH SERPL-ACNC: 1.02 UIU/ML
WBC # FLD AUTO: 5.02 K/UL

## 2021-06-25 ENCOUNTER — NON-APPOINTMENT (OUTPATIENT)
Age: 86
End: 2021-06-25

## 2021-07-28 ENCOUNTER — APPOINTMENT (OUTPATIENT)
Dept: CV DIAGNOSTICS | Facility: HOSPITAL | Age: 86
End: 2021-07-28
Payer: MEDICARE

## 2021-07-28 ENCOUNTER — OUTPATIENT (OUTPATIENT)
Dept: OUTPATIENT SERVICES | Facility: HOSPITAL | Age: 86
LOS: 1 days | End: 2021-07-28

## 2021-07-28 DIAGNOSIS — Z95.1 PRESENCE OF AORTOCORONARY BYPASS GRAFT: Chronic | ICD-10-CM

## 2021-07-28 DIAGNOSIS — I25.10 ATHEROSCLEROTIC HEART DISEASE OF NATIVE CORONARY ARTERY WITHOUT ANGINA PECTORIS: ICD-10-CM

## 2021-07-28 PROCEDURE — 93018 CV STRESS TEST I&R ONLY: CPT | Mod: GC

## 2021-07-28 PROCEDURE — 78452 HT MUSCLE IMAGE SPECT MULT: CPT | Mod: 26

## 2021-07-28 PROCEDURE — 93016 CV STRESS TEST SUPVJ ONLY: CPT | Mod: GC

## 2021-10-29 ENCOUNTER — TRANSCRIPTION ENCOUNTER (OUTPATIENT)
Age: 86
End: 2021-10-29

## 2022-01-03 ENCOUNTER — NON-APPOINTMENT (OUTPATIENT)
Age: 87
End: 2022-01-03

## 2022-01-24 ENCOUNTER — NON-APPOINTMENT (OUTPATIENT)
Age: 87
End: 2022-01-24

## 2022-01-25 ENCOUNTER — APPOINTMENT (OUTPATIENT)
Dept: ELECTROPHYSIOLOGY | Facility: CLINIC | Age: 87
End: 2022-01-25
Payer: MEDICARE

## 2022-02-08 NOTE — REASON FOR VISIT
[FreeTextEntry1] : 83 year old male with CAD, HTN, hyperlipidemia, SVT, and hx of pulm dz. \par Here for check up. He has been under significant stress in setting of daughter's brain CA resulting in cognitive impairment.\par \par he has been adherent with his meds but he believes that he is taking his BBL incorrectly ( too low a dose). and reports that he has had some elevated BPs.\par \par He denies CP, FALLON or palpitations. No bleeding or bruising on his DAPT.\par \par Interval Note \par December 17, 2019\par \par Patient returns for a routine cardiac follow up. Blood pressure today is extremely elevated. EKG is in sinus bradycardia (on BB) which is stable and unchanged T wave abnormalities.\par \par Most recent nuclear stress test was performed on August 2018 which demonstrated 4 METS of activity which was poor for his age and gender; nuclear portion indicated a large, severe defect in the anterolateral wall that is fixed, suggestive of infarct. No change when compared to study from May 2016.\par with LVEF of 42%.\par \par Most recent echocardiogram performed in April of 2017. Endocardiogram demonstrated grossly mild to moderate segmental LV systolic dysfunction. 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  [facility] and is [facility] for [facility] check up.\par \par He feels " great" overall. Daughter is in remission from brain CA and living with him.\par \par \par \par Visit - 6/22/21\par \par Feeling well.\par \par Has been active . Had both Covid vaccines w little side effects.\par \par Reviewed med list - some confusion about dosing . Asked him to re-write his list.\par \par No CP, FALLON or palpitations. [facility].\par He has checked BP a few times and reports " elevated top number"\par \par

## 2022-02-11 ENCOUNTER — FORM ENCOUNTER (OUTPATIENT)
Age: 87
End: 2022-02-11

## 2022-02-15 ENCOUNTER — APPOINTMENT (OUTPATIENT)
Dept: CARDIOLOGY | Facility: CLINIC | Age: 87
End: 2022-02-15
Payer: MEDICARE

## 2022-02-28 PROCEDURE — 93244 EXT ECG>48HR<7D REV&INTERPJ: CPT

## 2022-03-01 ENCOUNTER — NON-APPOINTMENT (OUTPATIENT)
Age: 87
End: 2022-03-01

## 2022-03-01 ENCOUNTER — APPOINTMENT (OUTPATIENT)
Dept: CARDIOLOGY | Facility: CLINIC | Age: 87
End: 2022-03-01
Payer: MEDICARE

## 2022-03-01 VITALS
HEART RATE: 61 BPM | TEMPERATURE: 97 F | HEIGHT: 71 IN | SYSTOLIC BLOOD PRESSURE: 166 MMHG | OXYGEN SATURATION: 98 % | BODY MASS INDEX: 28 KG/M2 | WEIGHT: 200 LBS | DIASTOLIC BLOOD PRESSURE: 81 MMHG | RESPIRATION RATE: 16 BRPM

## 2022-03-01 DIAGNOSIS — I47.1 SUPRAVENTRICULAR TACHYCARDIA: ICD-10-CM

## 2022-03-01 LAB
BASOPHILS # BLD AUTO: 0.02 K/UL
BASOPHILS NFR BLD AUTO: 0.4 %
EOSINOPHIL # BLD AUTO: 0.1 K/UL
EOSINOPHIL NFR BLD AUTO: 1.8 %
HCT VFR BLD CALC: 39.8 %
HGB BLD-MCNC: 12.8 G/DL
IMM GRANULOCYTES NFR BLD AUTO: 0.4 %
LYMPHOCYTES # BLD AUTO: 0.89 K/UL
LYMPHOCYTES NFR BLD AUTO: 15.8 %
MAN DIFF?: NORMAL
MCHC RBC-ENTMCNC: 30 PG
MCHC RBC-ENTMCNC: 32.2 GM/DL
MCV RBC AUTO: 93.4 FL
MONOCYTES # BLD AUTO: 0.47 K/UL
MONOCYTES NFR BLD AUTO: 8.3 %
NEUTROPHILS # BLD AUTO: 4.15 K/UL
NEUTROPHILS NFR BLD AUTO: 73.3 %
PLATELET # BLD AUTO: 206 K/UL
RBC # BLD: 4.26 M/UL
RBC # FLD: 14.5 %
WBC # FLD AUTO: 5.65 K/UL

## 2022-03-01 PROCEDURE — 93000 ELECTROCARDIOGRAM COMPLETE: CPT

## 2022-03-01 PROCEDURE — 99214 OFFICE O/P EST MOD 30 MIN: CPT

## 2022-03-01 RX ORDER — METOPROLOL SUCCINATE 25 MG/1
25 TABLET, EXTENDED RELEASE ORAL
Qty: 90 | Refills: 2 | Status: ACTIVE | COMMUNITY
Start: 2022-03-01 | End: 1900-01-01

## 2022-03-01 NOTE — ASSESSMENT
[FreeTextEntry1] : 83 year old male with s/p thoracic aneurysm repair,  CAD, HTN, hyperlipidemia, SVT, and pulmonary disease.\par \par #HTN - BP elevated \par Increase Metoprolol XL 75 mg QD \par Continue Lisinopril 40 mg QD and Spironolactone 25 mg QD\par Counselled on dietary discretion ( he adds additional salt to every meal)\par Encouraged Patient to monitor BP at home and keep a log and report results back to us for evaluation.\par Additionally, encouraged heart healthy diet and exercise as tolerated.\par EKG with no acute changes.\par Check labs today \par \par # CAD - reviewed last years echo with small WMA. Has not had stress in several years. \par Stress test and echo scheduled \par \par # Lipids - no recent labs - labs drawn today. On mid dose statin\par \par # SVT : Intermittent noted on Holter\par Increase Metop succ 75 mg QD \par \par FU in 6 months \par \par

## 2022-03-01 NOTE — HISTORY OF PRESENT ILLNESS
[FreeTextEntry1] : 83 year old male with CAD, HTN, hyperlipidemia, SVT, and hx of pulm dz. \par He is under significant stress in setting of daughter's brain CA resulting in cognitive impairment.\par He sees the provider at [facility]. He feels well. Denies chest pain, shortness of breath, dizziness, lightheadedness, or near syncope or syncope, orthopnea, PND and increasing lower extremity edema. Complaints intermittent palpitations. \par Sleeps on 2 pillows\par Holter - min 39- 143. first degree HB noted\par              SVT ~ 143 lsting for 31 .2 sec, occ PVC's triplets noted.

## 2022-03-03 LAB
ALBUMIN SERPL ELPH-MCNC: 4.7 G/DL
ALP BLD-CCNC: 88 U/L
ALT SERPL-CCNC: 18 U/L
ANION GAP SERPL CALC-SCNC: 15 MMOL/L
AST SERPL-CCNC: 17 U/L
BILIRUB SERPL-MCNC: 0.4 MG/DL
BUN SERPL-MCNC: 35 MG/DL
CALCIUM SERPL-MCNC: 9.5 MG/DL
CHLORIDE SERPL-SCNC: 102 MMOL/L
CHOLEST SERPL-MCNC: 152 MG/DL
CO2 SERPL-SCNC: 23 MMOL/L
CREAT SERPL-MCNC: 1.55 MG/DL
EGFR: 44 ML/MIN/1.73M2
ESTIMATED AVERAGE GLUCOSE: 117 MG/DL
GLUCOSE SERPL-MCNC: 72 MG/DL
HBA1C MFR BLD HPLC: 5.7 %
HDLC SERPL-MCNC: 53 MG/DL
LDLC SERPL CALC-MCNC: 81 MG/DL
NONHDLC SERPL-MCNC: 98 MG/DL
POTASSIUM SERPL-SCNC: 5.4 MMOL/L
PROT SERPL-MCNC: 7.7 G/DL
SODIUM SERPL-SCNC: 140 MMOL/L
TRIGL SERPL-MCNC: 86 MG/DL
TSH SERPL-ACNC: 1.16 UIU/ML

## 2022-03-04 ENCOUNTER — APPOINTMENT (OUTPATIENT)
Dept: CV DIAGNOSTICS | Facility: HOSPITAL | Age: 87
End: 2022-03-04
Payer: MEDICARE

## 2022-03-04 ENCOUNTER — OUTPATIENT (OUTPATIENT)
Dept: OUTPATIENT SERVICES | Facility: HOSPITAL | Age: 87
LOS: 1 days | End: 2022-03-04

## 2022-03-04 ENCOUNTER — NON-APPOINTMENT (OUTPATIENT)
Age: 87
End: 2022-03-04

## 2022-03-04 DIAGNOSIS — Z95.1 PRESENCE OF AORTOCORONARY BYPASS GRAFT: Chronic | ICD-10-CM

## 2022-03-04 DIAGNOSIS — R07.9 CHEST PAIN, UNSPECIFIED: ICD-10-CM

## 2022-03-04 PROCEDURE — 93018 CV STRESS TEST I&R ONLY: CPT | Mod: GC

## 2022-03-04 PROCEDURE — 78452 HT MUSCLE IMAGE SPECT MULT: CPT | Mod: 26,MH

## 2022-03-04 PROCEDURE — 93016 CV STRESS TEST SUPVJ ONLY: CPT | Mod: GC

## 2022-03-07 RX ORDER — SPIRONOLACTONE 25 MG/1
25 TABLET ORAL
Qty: 90 | Refills: 3 | Status: DISCONTINUED | COMMUNITY
Start: 2018-08-02 | End: 2022-03-07

## 2022-03-22 ENCOUNTER — TRANSCRIPTION ENCOUNTER (OUTPATIENT)
Age: 87
End: 2022-03-22

## 2022-03-22 ENCOUNTER — OUTPATIENT (OUTPATIENT)
Dept: OUTPATIENT SERVICES | Facility: HOSPITAL | Age: 87
LOS: 1 days | End: 2022-03-22
Payer: COMMERCIAL

## 2022-03-22 VITALS
RESPIRATION RATE: 18 BRPM | OXYGEN SATURATION: 95 % | SYSTOLIC BLOOD PRESSURE: 136 MMHG | HEART RATE: 60 BPM | DIASTOLIC BLOOD PRESSURE: 85 MMHG

## 2022-03-22 VITALS
RESPIRATION RATE: 16 BRPM | WEIGHT: 199.96 LBS | DIASTOLIC BLOOD PRESSURE: 70 MMHG | OXYGEN SATURATION: 98 % | SYSTOLIC BLOOD PRESSURE: 143 MMHG | HEIGHT: 71 IN | TEMPERATURE: 98 F | HEART RATE: 61 BPM

## 2022-03-22 DIAGNOSIS — Z95.1 PRESENCE OF AORTOCORONARY BYPASS GRAFT: Chronic | ICD-10-CM

## 2022-03-22 DIAGNOSIS — R94.39 ABNORMAL RESULT OF OTHER CARDIOVASCULAR FUNCTION STUDY: ICD-10-CM

## 2022-03-22 LAB
ALBUMIN SERPL ELPH-MCNC: 4.8 G/DL — SIGNIFICANT CHANGE UP (ref 3.3–5)
ALP SERPL-CCNC: 78 U/L — SIGNIFICANT CHANGE UP (ref 40–120)
ALT FLD-CCNC: 16 U/L — SIGNIFICANT CHANGE UP (ref 10–45)
ANION GAP SERPL CALC-SCNC: 14 MMOL/L — SIGNIFICANT CHANGE UP (ref 5–17)
AST SERPL-CCNC: 18 U/L — SIGNIFICANT CHANGE UP (ref 10–40)
BILIRUB SERPL-MCNC: 0.5 MG/DL — SIGNIFICANT CHANGE UP (ref 0.2–1.2)
BUN SERPL-MCNC: 39 MG/DL — HIGH (ref 7–23)
CALCIUM SERPL-MCNC: 10 MG/DL — SIGNIFICANT CHANGE UP (ref 8.4–10.5)
CHLORIDE SERPL-SCNC: 102 MMOL/L — SIGNIFICANT CHANGE UP (ref 96–108)
CO2 SERPL-SCNC: 22 MMOL/L — SIGNIFICANT CHANGE UP (ref 22–31)
CREAT SERPL-MCNC: 1.79 MG/DL — HIGH (ref 0.5–1.3)
EGFR: 37 ML/MIN/1.73M2 — LOW
GLUCOSE SERPL-MCNC: 97 MG/DL — SIGNIFICANT CHANGE UP (ref 70–99)
HCT VFR BLD CALC: 42.2 % — SIGNIFICANT CHANGE UP (ref 39–50)
HGB BLD-MCNC: 13.7 G/DL — SIGNIFICANT CHANGE UP (ref 13–17)
MCHC RBC-ENTMCNC: 30.9 PG — SIGNIFICANT CHANGE UP (ref 27–34)
MCHC RBC-ENTMCNC: 32.5 GM/DL — SIGNIFICANT CHANGE UP (ref 32–36)
MCV RBC AUTO: 95 FL — SIGNIFICANT CHANGE UP (ref 80–100)
NRBC # BLD: 0 /100 WBCS — SIGNIFICANT CHANGE UP (ref 0–0)
PLATELET # BLD AUTO: 167 K/UL — SIGNIFICANT CHANGE UP (ref 150–400)
POTASSIUM SERPL-MCNC: 4.4 MMOL/L — SIGNIFICANT CHANGE UP (ref 3.5–5.3)
POTASSIUM SERPL-SCNC: 4.4 MMOL/L — SIGNIFICANT CHANGE UP (ref 3.5–5.3)
PROT SERPL-MCNC: 8.2 G/DL — SIGNIFICANT CHANGE UP (ref 6–8.3)
RBC # BLD: 4.44 M/UL — SIGNIFICANT CHANGE UP (ref 4.2–5.8)
RBC # FLD: 14.3 % — SIGNIFICANT CHANGE UP (ref 10.3–14.5)
SODIUM SERPL-SCNC: 138 MMOL/L — SIGNIFICANT CHANGE UP (ref 135–145)
WBC # BLD: 5.54 K/UL — SIGNIFICANT CHANGE UP (ref 3.8–10.5)
WBC # FLD AUTO: 5.54 K/UL — SIGNIFICANT CHANGE UP (ref 3.8–10.5)

## 2022-03-22 PROCEDURE — C1887: CPT

## 2022-03-22 PROCEDURE — 85027 COMPLETE CBC AUTOMATED: CPT

## 2022-03-22 PROCEDURE — C1769: CPT

## 2022-03-22 PROCEDURE — 80053 COMPREHEN METABOLIC PANEL: CPT

## 2022-03-22 PROCEDURE — 99152 MOD SED SAME PHYS/QHP 5/>YRS: CPT

## 2022-03-22 PROCEDURE — 93455 CORONARY ART/GRFT ANGIO S&I: CPT

## 2022-03-22 PROCEDURE — 93010 ELECTROCARDIOGRAM REPORT: CPT

## 2022-03-22 PROCEDURE — 93005 ELECTROCARDIOGRAM TRACING: CPT

## 2022-03-22 PROCEDURE — 93455 CORONARY ART/GRFT ANGIO S&I: CPT | Mod: 26

## 2022-03-22 PROCEDURE — 99153 MOD SED SAME PHYS/QHP EA: CPT

## 2022-03-22 PROCEDURE — C1894: CPT

## 2022-03-22 PROCEDURE — 99443: CPT

## 2022-03-22 RX ORDER — SODIUM CHLORIDE 9 MG/ML
1000 INJECTION INTRAMUSCULAR; INTRAVENOUS; SUBCUTANEOUS
Refills: 0 | Status: DISCONTINUED | OUTPATIENT
Start: 2022-03-22 | End: 2022-04-05

## 2022-03-22 RX ORDER — SODIUM CHLORIDE 9 MG/ML
3 INJECTION INTRAMUSCULAR; INTRAVENOUS; SUBCUTANEOUS EVERY 8 HOURS
Refills: 0 | Status: DISCONTINUED | OUTPATIENT
Start: 2022-03-22 | End: 2022-04-05

## 2022-03-22 RX ORDER — SODIUM CHLORIDE 9 MG/ML
250 INJECTION INTRAMUSCULAR; INTRAVENOUS; SUBCUTANEOUS ONCE
Refills: 0 | Status: COMPLETED | OUTPATIENT
Start: 2022-03-22 | End: 2022-03-22

## 2022-03-22 RX ORDER — SODIUM CHLORIDE 9 MG/ML
1000 INJECTION INTRAMUSCULAR; INTRAVENOUS; SUBCUTANEOUS
Refills: 0 | Status: DISCONTINUED | OUTPATIENT
Start: 2022-03-22 | End: 2022-03-22

## 2022-03-22 RX ADMIN — SODIUM CHLORIDE 500 MILLILITER(S): 9 INJECTION INTRAMUSCULAR; INTRAVENOUS; SUBCUTANEOUS at 11:01

## 2022-03-22 RX ADMIN — SODIUM CHLORIDE 200 MILLILITER(S): 9 INJECTION INTRAMUSCULAR; INTRAVENOUS; SUBCUTANEOUS at 11:01

## 2022-03-22 NOTE — ASU DISCHARGE PLAN (ADULT/PEDIATRIC) - CARE PROVIDER_API CALL
Rubina White)  Cardiology; Internal Medicine  [phone] [place] [address]  Phone: [phone]  Fax: [phone]  Established Patient  Follow Up Time: 2 weeks

## 2022-03-22 NOTE — H&P CARDIOLOGY - NSICDXFAMILYHX_GEN_ALL_CORE_FT
FAMILY HISTORY:  Father  Still living? No  Family history of blood clots, Age at diagnosis: Age Unknown  Family history of coronary artery disease, Age at diagnosis: Age Unknown    Mother  Still living? No  Family history of coronary artery disease, Age at diagnosis: Age Unknown

## 2022-03-22 NOTE — H&P CARDIOLOGY - NSICDXPASTMEDICALHX_GEN_ALL_CORE_FT
PAST MEDICAL HISTORY:  Aneurysm of descending aorta treated surgically 12 years ago    Back spasm patient admits to left sided back spasms for a long time that is worse laying in bed for long periods of time    CAD (Coronary Artery Disease)     CHF (Congestive Heart Failure)     Dyslipidemia     History of prostate cancer treated with cryotherapy    HTN (Hypertension), Benign     Hyperlipidemia     Old MI (myocardial infarction) 12 years ago    Status post primary angioplasty with coronary stent 5-6 years ago

## 2022-03-22 NOTE — H&P CARDIOLOGY - HISTORY OF PRESENT ILLNESS
83 year old male with PMHx of CAD, HTN, hyperlipidemia, SVT, and hx of pulm dz.   He is under significant stress in setting of daughter's brain CA resulting in cognitive impairment.  He sees the provider at [facility]. He feels well. Denies chest pain, shortness of breath, dizziness, lightheadedness, or near syncope or syncope, orthopnea, PND and increasing lower extremity edema. Complaints intermittent palpitations.   Sleeps on 2 pillows  Holter - min 39- 143. first degree HB noted   SVT ~ 143 lsting for 31 .2 sec, occ PVC's triplets noted.     < from: Nuclear Stress Test-Pharmacologic (03.04.22 @ 11:40) >  GATED ANALYSIS:  Post-stress gated wall motion analysis was performed (LVEF  = 51 %;LVEDV = 134 ml.), revealing lateral and  anterolateral wall hypokineses. The RV function appared  normal.  ------------------------------------------------------------------------  IMPRESSIONS:Abnormal Study  * Myocardial Perfusion SPECT results are abnormal.  * There is a medium sized, severe defect in lateral and  anterolateral wall that is fixed, suggestive of lateral  wall infarct.There are small, mild to moderate defects in  inferior and inferolateral walls that are predominantly  reversible, suggestive of infarction with significant  olga-infarct ischemia.  * Post-stress gated wall motion analysis was performed  (LVEF = 51 %;LVEDV = 134 ml.), revealing lateral and  anterolateral wall hypokineses. The RV function appared  normal.    < end of copied text >   83 year old male with PMHx of CAD, CABG (25 years ago at [facility]), HTN, hyperlipidemia, SVT, LBP presents for cardiac cath. Pt reports he felt SOB with intermittent palpitation about a month ago, evaluated by Dr. Liang found to have fast heart rate which led to have further work ups. Holter monitor showed HR to 39- 143/min with 1st degree HB and had abnormal NST (see below). Pt denies chest pain, shortness of breath, dizziness, lightheadedness, or near syncope or syncope, orthopnea, PND and increasing lower extremity edema.    < from: Nuclear Stress Test-Pharmacologic (03.04.22 @ 11:40) >  GATED ANALYSIS:  Post-stress gated wall motion analysis was performed (LVEF = 51 %;LVEDV = 134 ml.), revealing lateral and anterolateral wall hypokineses. The RV function appeared normal.  ------------------------------------------------------------------------  IMPRESSIONS: Abnormal Study  * Myocardial Perfusion SPECT results are abnormal.   * There is a medium sized, severe defect in lateral and anterolateral wall that is fixed, suggestive of lateral wall infarct. There are small, mild to moderate defects in  inferior and inferolateral walls that are predominantly reversible, suggestive of infarction with significant olga-infarct ischemia.  * Post-stress gated wall motion analysis was performed (LVEF = 51 %;LVEDV = 134 ml.), revealing lateral and anterolateral wall hypokineses. The RV function appeared normal.   < end of copied text >    < from: TTE with Doppler (w/Cont) (02.18.20 @ 08:18) >  CONCLUSIONS:  1. Mitral annular calcification, otherwise normal mitral valve. Mild mitral regurgitation.  2. Calcified trileaflet aortic valve with normal opening.  3. Eccentric left ventricular hypertrophy (dilated left ventricle with normal relative wall thickness).  4. Endocardial visualization enhanced with intravenous injection of echo contrast (Definity).   Mild segmental left ventricular systolic dysfunction. The anterolateral wall is hypokinetic.  5. Normal right ventricular size and function. < end of copied text >

## 2022-03-22 NOTE — H&P CARDIOLOGY - NSICDXPASTSURGICALHX_GEN_ALL_CORE_FT
PAST SURGICAL HISTORY:  Aneurysm of descending aorta 12 years ago    H/O: Knee Surgery     S/P CABG (coronary artery bypass graft) LIMA to LAD    stent

## 2022-03-22 NOTE — CHART NOTE - NSCHARTNOTEFT_GEN_A_CORE
83 year old male with PMHx of CAD, CABG (25 years ago at [facility]), HTN, hyperlipidemia, SVT, LBP presents for cardiac cath. Pt reports he felt SOB with intermittent palpitation about a month ago, evaluated by Dr. Liang found to have fast heart rate which led to have further work ups. Holter monitor showed HR to 39- 143/min with 1st degree HB and had abnormal NST (see below). Pt denies chest pain, shortness of breath, dizziness, lightheadedness, or near syncope or syncope, orthopnea, PND and increasing lower extremity edema.    Patient also with h/o CKD III - followd by Nephrology, not noted in HPI, creatinine on 3/1/22 1.55, creatinine on lab work today 1.79.  He is now s/p diagnostic cath and did not receive pre procedural IVF.  Case discussed with Dr. Loomis    Plan.  NS 250cc bolus over 20 minutes followed by NS 200cc/hr until time of discharge  Hold ACE x 2 days  Follow-up with PMD or Nephrologist on 3/25 for repeat bmp  Plan discussed with patient with appropriate teach back    Cira Quintero ANP-C  [id number]

## 2022-03-22 NOTE — ASU DISCHARGE PLAN (ADULT/PEDIATRIC) - ASU DC SPECIAL INSTRUCTIONSFT
see preprinted instruction sheet  follow-up with your PMD on Friday 3/25 to have blood work to check your kidney function Follow-up with your PMD on Nephrologist on Friday 3/25 to have blood work to check your kidney function  Wound Care:   the day AFTER your procedure remove bandage GENTLY, and clean using  mild soap and gentle warm, water stream, pat dry. leave OPEN to air. YOU MAY SHOWER   DO NOT apply lotions, creams, ointments, powder, perfumes to your incision site  DO NOT SOAK your site for 1 week ( no baths, no pools, no tubs, etc...)  Check  your groin and /or wrist daily. A small amount of bruising, and soreness are normal    ACTIVITY: for 24 hours   - DO NOT DRIVE  - DO NOT make any important decisions or sign legal documents   - DO NOT operate heavy machineries   - you may resume sexual activity in 48 hours, unless otherwise instructed by your cardiologist     If your procedure was done through the WRIST: for the NEXT 3DAYS:  - avoid pushing, pulling, with that affected wrist   - avoid repeated movement of that hand and wrist ( eg: typing, hammering)  - DO NOT LIFT anything more than 5 lbs     If your procedure was done through the GROIN: for the NEXT 5 DAYS  - Limit climbing stairs, DO NOT soak in bathtub or pool  - no strenuous activities, pushing, pulling, straining  - Do not lift anything 10lbs or heavier     MEDICATION:   take your medications as explained ( see discharge paperwork)   If you received a STENT, you will be taking antiplatelet medications to KEEP YOUR STENT OPEN ( eg: Aspirin, Plavix, Brilinta, Effient, etc).  Take as prescribed DO NOT STOP taking them without consulting with your cardiologist first.     Follow heart healthy diet recommended by your doctor, , if you smoke STOP SMOKING ( may call [phone] for center of tobacco control if you need assistance)     CALL your doctor to make appointment in 2 WEEKS     ***CALL YOUR DOCTOR***  if you experience: fever, chills, body aches, or severe pain, swelling, redness, heat or yellow discharge at incision site  If you experience Bleeding or excruciating pain at the procedural site, swelling ( golf ball size) at your procedural site  If you experience CHEST PAIN  If you experience extremity numbness, tingling, temperature change ( of your procedural site)   If you are unable to reach your doctor, you may contact:   -Cardiology Office at [facility] at [phone] or   - [facility] [phone]  - [phone]

## 2022-03-22 NOTE — ASU DISCHARGE PLAN (ADULT/PEDIATRIC) - NS MD DC FALL RISK RISK
For information on Fall & Injury Prevention, visit: [email or web address] OR  [email or web address] OR  https://www.cdc.gov/steadi/patient.html

## 2022-03-25 ENCOUNTER — APPOINTMENT (OUTPATIENT)
Dept: CARDIOLOGY | Facility: CLINIC | Age: 87
End: 2022-03-25
Payer: MEDICARE

## 2022-03-25 ENCOUNTER — LABORATORY RESULT (OUTPATIENT)
Age: 87
End: 2022-03-25

## 2022-03-25 PROCEDURE — 36415 COLL VENOUS BLD VENIPUNCTURE: CPT

## 2022-03-29 ENCOUNTER — NON-APPOINTMENT (OUTPATIENT)
Age: 87
End: 2022-03-29

## 2022-04-19 ENCOUNTER — APPOINTMENT (OUTPATIENT)
Dept: CARDIOLOGY | Facility: CLINIC | Age: 87
End: 2022-04-19
Payer: MEDICARE

## 2022-04-19 ENCOUNTER — NON-APPOINTMENT (OUTPATIENT)
Age: 87
End: 2022-04-19

## 2022-04-19 VITALS
OXYGEN SATURATION: 96 % | SYSTOLIC BLOOD PRESSURE: 136 MMHG | HEIGHT: 71 IN | BODY MASS INDEX: 28.59 KG/M2 | TEMPERATURE: 97.8 F | DIASTOLIC BLOOD PRESSURE: 78 MMHG | HEART RATE: 63 BPM

## 2022-04-19 VITALS — BODY MASS INDEX: 28.59 KG/M2 | WEIGHT: 205 LBS

## 2022-04-19 DIAGNOSIS — N52.9 MALE ERECTILE DYSFUNCTION, UNSPECIFIED: ICD-10-CM

## 2022-04-19 PROCEDURE — 93000 ELECTROCARDIOGRAM COMPLETE: CPT

## 2022-04-19 PROCEDURE — 99214 OFFICE O/P EST MOD 30 MIN: CPT

## 2022-04-19 NOTE — REASON FOR VISIT
[FreeTextEntry1] : 86 steve CAD, HTN lipids, CKD. Had cath after symptoms , abnl stress - no new obstructive lesions. on DAPT.\par \par Seeing Dr Mac - reviewed meds - reports that creatinine is improving.\par \par No recent episode of CP, FALLON or palpitations.\par

## 2022-04-19 NOTE — ASSESSMENT
[FreeTextEntry1] : 87 yo w\par CAD - despite banl nuke , no new obstructive slesions seen. Increased Lipitor to 80 mg ( claryfied on his list). To aim for LDL < 70. COntiue DAPT for one year alolong w ARB and BBL\par \par CKD - appreciate Dr Mac's help. Cr improving  reviewed current meds . next visit in July\par \par HTN - acceptable\par \par erectile dysfunction - looking to have penile prosthesis surgery. He will see  and follow up with us for pre - op evalutaion.

## 2022-04-19 NOTE — PHYSICAL EXAM
[Normal S1, S2] : normal S1, S2 [No Rub] : no rub [Normal] : no rash, no skin lesions [de-identified] : soft HSM

## 2022-08-02 ENCOUNTER — APPOINTMENT (OUTPATIENT)
Dept: CARDIOLOGY | Facility: CLINIC | Age: 87
End: 2022-08-02

## 2022-08-02 NOTE — PHYSICAL EXAM
[Normal S1, S2] : normal S1, S2 [No Rub] : no rub [Normal] : no rash, no skin lesions [de-identified] : soft HSM

## 2022-08-02 NOTE — REASON FOR VISIT
[FreeTextEntry1] : 86 steve CAD, HTN lipids, CKD. Had cath after symptoms , abnl stress - no new obstructive lesions. on DAPT.\par \par Seeing Dr Mac - reviewed meds - reports that creatinine is improving.\par \par No recent episode of CP, FALLON or palpitations.\par \par Interval Note\par August 2, 2022

## 2022-08-02 NOTE — ASSESSMENT
[FreeTextEntry1] : 85 yo w\par CAD - despite banl nuke , no new obstructive slesions seen. Increased Lipitor to 80 mg ( claryfied on his list). To aim for LDL < 70. COntiue DAPT for one year alolong w ARB and BBL\par \par CKD - appreciate Dr Mac's help. Cr improving  reviewed current meds . next visit in July\par \par HTN - acceptable\par \par erectile dysfunction - looking to have penile prosthesis surgery. He will see  and follow up with us for pre - op evalutaion.

## 2022-11-17 NOTE — ED ADULT NURSE NOTE - FALLEN IN THE PAST
[General Appearance - Well Developed] : well developed [General Appearance - Well Nourished] : well nourished [IV] : IV [Heart Sounds] : normal S1 and S2 [Murmurs] : no murmurs [] : no respiratory distress [Auscultation Breath Sounds / Voice Sounds] : lungs were clear to auscultation bilaterally [No Focal Deficits] : no focal deficits [Oriented To Time, Place, And Person] : oriented to person, place, and time [Memory Recent] : recent memory was not impaired no

## 2022-11-29 ENCOUNTER — APPOINTMENT (OUTPATIENT)
Dept: CARDIOLOGY | Facility: CLINIC | Age: 87
End: 2022-11-29
Payer: MEDICARE

## 2022-11-29 VITALS
HEART RATE: 55 BPM | DIASTOLIC BLOOD PRESSURE: 72 MMHG | BODY MASS INDEX: 28.17 KG/M2 | OXYGEN SATURATION: 96 % | SYSTOLIC BLOOD PRESSURE: 118 MMHG | TEMPERATURE: 94.2 F | HEIGHT: 71 IN

## 2022-11-29 VITALS — WEIGHT: 202 LBS | BODY MASS INDEX: 28.17 KG/M2

## 2022-11-29 PROCEDURE — 93000 ELECTROCARDIOGRAM COMPLETE: CPT

## 2022-11-29 PROCEDURE — 99214 OFFICE O/P EST MOD 30 MIN: CPT | Mod: 25

## 2022-12-06 LAB
ALBUMIN SERPL ELPH-MCNC: 4.2 G/DL
ALP BLD-CCNC: 79 U/L
ALT SERPL-CCNC: 22 U/L
ANION GAP SERPL CALC-SCNC: 11 MMOL/L
AST SERPL-CCNC: 18 U/L
BASOPHILS # BLD AUTO: 0.02 K/UL
BASOPHILS NFR BLD AUTO: 0.4 %
BILIRUB SERPL-MCNC: 0.2 MG/DL
BUN SERPL-MCNC: 47 MG/DL
CALCIUM SERPL-MCNC: 9.4 MG/DL
CHLORIDE SERPL-SCNC: 105 MMOL/L
CHOLEST SERPL-MCNC: 129 MG/DL
CO2 SERPL-SCNC: 24 MMOL/L
CREAT SERPL-MCNC: 1.88 MG/DL
EGFR: 35 ML/MIN/1.73M2
EOSINOPHIL # BLD AUTO: 0.08 K/UL
EOSINOPHIL NFR BLD AUTO: 1.7 %
ESTIMATED AVERAGE GLUCOSE: 120 MG/DL
GLUCOSE SERPL-MCNC: 87 MG/DL
HBA1C MFR BLD HPLC: 5.8 %
HCT VFR BLD CALC: 37.1 %
HDLC SERPL-MCNC: 45 MG/DL
HGB BLD-MCNC: 12.1 G/DL
IMM GRANULOCYTES NFR BLD AUTO: 0.6 %
LDLC SERPL CALC-MCNC: 67 MG/DL
LYMPHOCYTES # BLD AUTO: 0.69 K/UL
LYMPHOCYTES NFR BLD AUTO: 14.6 %
MAN DIFF?: NORMAL
MCHC RBC-ENTMCNC: 31.5 PG
MCHC RBC-ENTMCNC: 32.6 GM/DL
MCV RBC AUTO: 96.6 FL
MONOCYTES # BLD AUTO: 0.36 K/UL
MONOCYTES NFR BLD AUTO: 7.6 %
NEUTROPHILS # BLD AUTO: 3.53 K/UL
NEUTROPHILS NFR BLD AUTO: 75.1 %
NONHDLC SERPL-MCNC: 84 MG/DL
PLATELET # BLD AUTO: 164 K/UL
POTASSIUM SERPL-SCNC: 5.1 MMOL/L
PROT SERPL-MCNC: 6.8 G/DL
RBC # BLD: 3.84 M/UL
RBC # FLD: 14 %
SODIUM SERPL-SCNC: 140 MMOL/L
TRIGL SERPL-MCNC: 89 MG/DL
WBC # FLD AUTO: 4.71 K/UL

## 2022-12-06 NOTE — PHYSICAL EXAM
[Normal S1, S2] : normal S1, S2 [No Rub] : no rub [Normal] : no rash, no skin lesions [de-identified] : soft HSM

## 2022-12-06 NOTE — ASSESSMENT
[FreeTextEntry1] : 86 yo w \par CAD - last srping stress was abnl - 3/22/22 had cath showed signifiacnt dz ( including distal LIMA) but no interventions. WIll continue on current meds. Asked pt to see Dr Mac - labs drawn today to assess Bun/Cr.\par Asked him not to change meds until he sees dr mac -- [facility] has him on once daily hydralazine along w diuretics.

## 2022-12-06 NOTE — REASON FOR VISIT
[FreeTextEntry1] : 88 yo w CAD, HTN, lipids, SVT,CKD here for check up.\par \par Feeling well - seen by internist - biggest issues is recurrence of his daughter's brain CA She is living with him\par \par Reviewed meds and there is some inconsistency from my records - [facility] by [facility].\par \par He has not seen Dr Mac is " a while"\par \par No CP, FALLON , palpitations

## 2023-03-21 ENCOUNTER — NON-APPOINTMENT (OUTPATIENT)
Age: 88
End: 2023-03-21

## 2023-03-27 ENCOUNTER — APPOINTMENT (OUTPATIENT)
Dept: CARDIOLOGY | Facility: CLINIC | Age: 88
End: 2023-03-27
Payer: MEDICARE

## 2023-03-27 ENCOUNTER — NON-APPOINTMENT (OUTPATIENT)
Age: 88
End: 2023-03-27

## 2023-03-27 VITALS
HEART RATE: 88 BPM | OXYGEN SATURATION: 96 % | SYSTOLIC BLOOD PRESSURE: 128 MMHG | DIASTOLIC BLOOD PRESSURE: 80 MMHG | BODY MASS INDEX: 28 KG/M2 | HEIGHT: 71 IN | WEIGHT: 200 LBS

## 2023-03-27 DIAGNOSIS — R00.2 PALPITATIONS: ICD-10-CM

## 2023-03-27 DIAGNOSIS — I48.92 UNSPECIFIED ATRIAL FLUTTER: ICD-10-CM

## 2023-03-27 DIAGNOSIS — I10 ESSENTIAL (PRIMARY) HYPERTENSION: ICD-10-CM

## 2023-03-27 DIAGNOSIS — R06.02 SHORTNESS OF BREATH: ICD-10-CM

## 2023-03-27 DIAGNOSIS — N28.9 DISORDER OF KIDNEY AND URETER, UNSPECIFIED: ICD-10-CM

## 2023-03-27 DIAGNOSIS — Z98.890 OTHER SPECIFIED POSTPROCEDURAL STATES: ICD-10-CM

## 2023-03-27 DIAGNOSIS — R09.89 OTHER SPECIFIED SYMPTOMS AND SIGNS INVOLVING THE CIRCULATORY AND RESPIRATORY SYSTEMS: ICD-10-CM

## 2023-03-27 PROCEDURE — 99215 OFFICE O/P EST HI 40 MIN: CPT

## 2023-03-27 PROCEDURE — 93000 ELECTROCARDIOGRAM COMPLETE: CPT

## 2023-03-28 ENCOUNTER — OUTPATIENT (OUTPATIENT)
Dept: OUTPATIENT SERVICES | Facility: HOSPITAL | Age: 88
LOS: 1 days | End: 2023-03-28
Payer: MEDICARE

## 2023-03-28 ENCOUNTER — APPOINTMENT (OUTPATIENT)
Dept: RADIOLOGY | Facility: HOSPITAL | Age: 88
End: 2023-03-28

## 2023-03-28 ENCOUNTER — NON-APPOINTMENT (OUTPATIENT)
Age: 88
End: 2023-03-28

## 2023-03-28 ENCOUNTER — APPOINTMENT (OUTPATIENT)
Dept: CV DIAGNOSITCS | Facility: HOSPITAL | Age: 88
End: 2023-03-28

## 2023-03-28 DIAGNOSIS — I25.10 ATHEROSCLEROTIC HEART DISEASE OF NATIVE CORONARY ARTERY WITHOUT ANGINA PECTORIS: ICD-10-CM

## 2023-03-28 DIAGNOSIS — I48.92 UNSPECIFIED ATRIAL FLUTTER: ICD-10-CM

## 2023-03-28 DIAGNOSIS — R05.3 CHRONIC COUGH: ICD-10-CM

## 2023-03-28 DIAGNOSIS — E87.70 FLUID OVERLOAD, UNSPECIFIED: ICD-10-CM

## 2023-03-28 DIAGNOSIS — Z95.1 PRESENCE OF AORTOCORONARY BYPASS GRAFT: Chronic | ICD-10-CM

## 2023-03-28 LAB
ALBUMIN SERPL ELPH-MCNC: 4.7 G/DL
ALP BLD-CCNC: 97 U/L
ALT SERPL-CCNC: 33 U/L
ANION GAP SERPL CALC-SCNC: 15 MMOL/L
AST SERPL-CCNC: 25 U/L
BASOPHILS # BLD AUTO: 0.02 K/UL
BASOPHILS NFR BLD AUTO: 0.3 %
BILIRUB SERPL-MCNC: 0.3 MG/DL
BUN SERPL-MCNC: 42 MG/DL
CALCIUM SERPL-MCNC: 9.8 MG/DL
CHLORIDE SERPL-SCNC: 106 MMOL/L
CO2 SERPL-SCNC: 23 MMOL/L
CREAT SERPL-MCNC: 1.79 MG/DL
EGFR: 37 ML/MIN/1.73M2
EOSINOPHIL # BLD AUTO: 0.05 K/UL
EOSINOPHIL NFR BLD AUTO: 0.8 %
GLUCOSE SERPL-MCNC: 100 MG/DL
HCT VFR BLD CALC: 40.7 %
HGB BLD-MCNC: 14.1 G/DL
IMM GRANULOCYTES NFR BLD AUTO: 0.5 %
LYMPHOCYTES # BLD AUTO: 1.04 K/UL
LYMPHOCYTES NFR BLD AUTO: 15.7 %
MAN DIFF?: NORMAL
MCHC RBC-ENTMCNC: 34.5 PG
MCHC RBC-ENTMCNC: 34.6 GM/DL
MCV RBC AUTO: 99.5 FL
MONOCYTES # BLD AUTO: 0.56 K/UL
MONOCYTES NFR BLD AUTO: 8.4 %
NEUTROPHILS # BLD AUTO: 4.94 K/UL
NEUTROPHILS NFR BLD AUTO: 74.3 %
NT-PROBNP SERPL-MCNC: 2646 PG/ML
PLATELET # BLD AUTO: 190 K/UL
POTASSIUM SERPL-SCNC: 4.7 MMOL/L
PROT SERPL-MCNC: 7.7 G/DL
RBC # BLD: 4.09 M/UL
RBC # FLD: 15.2 %
SODIUM SERPL-SCNC: 143 MMOL/L
WBC # FLD AUTO: 6.64 K/UL

## 2023-03-28 PROCEDURE — 93306 TTE W/DOPPLER COMPLETE: CPT | Mod: 26

## 2023-03-28 PROCEDURE — 71046 X-RAY EXAM CHEST 2 VIEWS: CPT | Mod: 26

## 2023-03-28 NOTE — ASSESSMENT
[FreeTextEntry1] : Ms. Lester is now 87 years old and returns for a cardiovascular follow up.\par Patient's medical history is as outlined below:\par \par -Hx of Hypertension\par -Hx of Hyperlipidemia\par -Hx of CAD-> cath 2022:  severe disease to the distal LAD and distal LIMA that was not intervenable\par -Hx of PCI-  2005\par -Hx of CABG- 2000\par -Hx of Chronic kidney disease\par -Hx of thoracic aortic aneurysm repair ->stent placement: 1999\par -Hx of SVT in the past->AVNRT ablation in 2014 by Dr. Smyth\par -Hx of recurrent pneumonia\par -Hx of Gout\par \par Now in active atrial flutter, fluid overload and short of breath\par Brought in Dr. Barber Esquivel, EP specialist to evaluate.\par Recommended a cardiac ablation for what appears to be typical atrial flutter.\par \par #Hypertension\par   Blood pressure well controlled\par   Continue on Lisinopril 0 mg QD\par   Continue on Chlorthalidone 25 mg QD\par \par #Hyperlipidemia/CAD\par   Reviewed recent lipid profile -> December 6 2022\par   Total cholesterol-  129\par   HDL                         45\par   LDL                          67\par   Continue on Atorvastatin 80 mg QHS\par   Continue on Plavix 75 mg QD\par   Continue on Aspirin 81 mg QD\par \par #AAA repair\par   Patient will need repeat CT of the Chest when stable from an arrhythmia standpoint\par \par #Atrial Flutter\par   Discussed treatment with Dr. Joshua, EP specialist\par   Patient is currently fluid overloaded\par   CXR to rule out active pneumonia\par   Treated today with Furosemide 40 mg and will repeat tomorrow AM\par   Patient will be admitted to evaluation and treatment on Thursday, March 29, 2023\par   Patient deferred hospitalization today\par \par \par   \par

## 2023-03-28 NOTE — CARDIOLOGY SUMMARY
[de-identified] : 3/27/2023\par Atrial flutter @ 97 bpm  [de-identified] : 2/18/2020\par Echo\par Mild mitral regurgitaiton\par Eccentric LVH (dilated LV with normal relative wall thickness)\par Mild segmental LV systolic dysfunction\par The anterolateral wall is hypokinetic\par Normal RV size and function  [de-identified] : March 22, 2022\par Cath\par LAD- 100% stenosis in the middle third portion of the segment\par Cx-    30% middle third portion\par RCA- 30% stenosis distal third\par LIMA-LAD-> distal non intervenable

## 2023-03-28 NOTE — PHYSICAL EXAM
[Normal S1, S2] : normal S1, S2 [No Rub] : no rub [Normal] : normal [Normal Rate] : normal [de-identified] : soft HSM [de-identified] : bibasilar rales [de-identified] : bilateral ankle edema

## 2023-03-28 NOTE — REASON FOR VISIT
[FreeTextEntry1] : 86 yo w CAD, HTN, lipids, SVT,CKD here for check up.\par \par Feeling well - seen by internist - biggest issues is recurrence of his daughter's brain CA She is living with him\par \par Reviewed meds and there is some inconsistency from my records - [facility]s [facility] by [facility].\par \par He has not seen Dr Mac is " a while"\par \par No CP, FALLON , palpitations\par \par Interval Note\par March 27, 2023\par \par Ms. Lester is now 87 years old and returns for a cardiovascular follow up.\par Patient's medical history is as outlined below:\par \par -Hx of Hypertension\par -Hx of Hyperlipidemia\par -Hx of CAD-> cath 2022:  severe disease to the distal LAD and distal LIMA that was not intervenable\par -Hx of PCI-  2005\par -Hx of CABG- 2000\par -Hx of Chronic kidney disease\par -Hx of thoracic aortic aneurysm repair ->stent placement: 1999\par -Hx of SVT in the past->AVNRT ablation in 2014 by Dr. Smyth\par -Hx of recurrent pneumonia\par -Hx of renal insufficiency\par -Hx of cardiac ablation at [facility] [facility] in 2013\par \par Blood pressure today:  128/ 80\par EKG- atrial flutter @ 97 bpm\par \par Mr. Lester presents today with a history of a 2-3 week " loose cough", non productive, shortness of breath and complaints of palpitations. \par \par EKG, as noted above-> demonstrates atrial flutter.\par Physical examination reveals 3+ bilateral lower extremity edema and bibasilar, pulmonary crackles\par \par \par \par

## 2023-03-29 RX ORDER — APIXABAN 2.5 MG/1
2.5 TABLET, FILM COATED ORAL
Qty: 180 | Refills: 3 | Status: ACTIVE | COMMUNITY
Start: 2023-03-28 | End: 1900-01-01

## 2023-03-29 RX ORDER — ATORVASTATIN CALCIUM 80 MG/1
80 TABLET, FILM COATED ORAL
Qty: 90 | Refills: 3 | Status: ACTIVE | COMMUNITY
Start: 2020-02-18 | End: 1900-01-01

## 2023-03-30 ENCOUNTER — OUTPATIENT (OUTPATIENT)
Dept: INPATIENT UNIT | Facility: HOSPITAL | Age: 88
LOS: 1 days | End: 2023-03-30
Payer: MEDICARE

## 2023-03-30 ENCOUNTER — APPOINTMENT (OUTPATIENT)
Dept: CV DIAGNOSITCS | Facility: HOSPITAL | Age: 88
End: 2023-03-30

## 2023-03-30 ENCOUNTER — TRANSCRIPTION ENCOUNTER (OUTPATIENT)
Age: 88
End: 2023-03-30

## 2023-03-30 VITALS
TEMPERATURE: 98 F | RESPIRATION RATE: 18 BRPM | DIASTOLIC BLOOD PRESSURE: 89 MMHG | HEIGHT: 71 IN | SYSTOLIC BLOOD PRESSURE: 130 MMHG | OXYGEN SATURATION: 98 % | HEART RATE: 109 BPM | WEIGHT: 199.96 LBS

## 2023-03-30 VITALS
RESPIRATION RATE: 16 BRPM | DIASTOLIC BLOOD PRESSURE: 71 MMHG | OXYGEN SATURATION: 99 % | HEART RATE: 87 BPM | SYSTOLIC BLOOD PRESSURE: 103 MMHG

## 2023-03-30 DIAGNOSIS — I48.3 TYPICAL ATRIAL FLUTTER: ICD-10-CM

## 2023-03-30 DIAGNOSIS — Z95.1 PRESENCE OF AORTOCORONARY BYPASS GRAFT: Chronic | ICD-10-CM

## 2023-03-30 LAB
BLD GP AB SCN SERPL QL: NEGATIVE — SIGNIFICANT CHANGE UP
RH IG SCN BLD-IMP: POSITIVE — SIGNIFICANT CHANGE UP
RH IG SCN BLD-IMP: POSITIVE — SIGNIFICANT CHANGE UP

## 2023-03-30 PROCEDURE — 93010 ELECTROCARDIOGRAM REPORT: CPT

## 2023-03-30 RX ORDER — APIXABAN 2.5 MG/1
1 TABLET, FILM COATED ORAL
Qty: 60 | Refills: 3
Start: 2023-03-30 | End: 2023-07-27

## 2023-03-30 RX ORDER — APIXABAN 2.5 MG/1
2.5 TABLET, FILM COATED ORAL
Refills: 0 | Status: DISCONTINUED | OUTPATIENT
Start: 2023-03-30 | End: 2023-03-30

## 2023-03-30 RX ORDER — METOPROLOL TARTRATE 50 MG
1 TABLET ORAL
Qty: 0 | Refills: 0 | DISCHARGE

## 2023-03-30 RX ORDER — LISINOPRIL 2.5 MG/1
1 TABLET ORAL
Qty: 0 | Refills: 0 | DISCHARGE

## 2023-03-30 RX ORDER — ATORVASTATIN CALCIUM 80 MG/1
1 TABLET, FILM COATED ORAL
Qty: 0 | Refills: 0 | DISCHARGE

## 2023-03-30 RX ORDER — APIXABAN 2.5 MG/1
2.5 TABLET, FILM COATED ORAL ONCE
Refills: 0 | Status: COMPLETED | OUTPATIENT
Start: 2023-03-30 | End: 2023-03-30

## 2023-03-30 RX ORDER — FLUTICASONE PROPIONATE 50 MCG
1 SPRAY, SUSPENSION NASAL
Qty: 0 | Refills: 0 | DISCHARGE

## 2023-03-30 RX ORDER — AMIODARONE HYDROCHLORIDE 400 MG/1
1 TABLET ORAL
Qty: 70 | Refills: 0
Start: 2023-03-30

## 2023-03-30 RX ORDER — AMIODARONE HYDROCHLORIDE 400 MG/1
400 TABLET ORAL ONCE
Refills: 0 | Status: COMPLETED | OUTPATIENT
Start: 2023-03-30 | End: 2023-03-30

## 2023-03-30 RX ORDER — APIXABAN 2.5 MG/1
1 TABLET, FILM COATED ORAL
Qty: 60 | Refills: 2
Start: 2023-03-30 | End: 2023-06-27

## 2023-03-30 RX ADMIN — AMIODARONE HYDROCHLORIDE 400 MILLIGRAM(S): 400 TABLET ORAL at 10:06

## 2023-03-30 RX ADMIN — APIXABAN 2.5 MILLIGRAM(S): 2.5 TABLET, FILM COATED ORAL at 10:10

## 2023-03-30 NOTE — H&P CARDIOLOGY - HISTORY OF PRESENT ILLNESS
87 years old male HTN, HLD, CAD Hx CABG (2000), PCI 2005, s/p diagnostic cath in  2022, with severe disease to distal LAD and distal LIMA that was not able to be intervened, CKD, SVT in the past AVNRT ablation in 2014 by Dr Smyth, Hx ablation at [facility] in 2013, thoracic aortic aneurysm repair (stent placement 1999) recently see by his cardiologist Dr White for 2-3 week of SOB, "loose cough", non productive and complaints of palpitations and was in atrial flutter on the EKG with +3LE edema and bibasilar pulmonary crackles. At this time Dr Joshua was called in to evaluated and recommended ablation for what appears to be typical atrial flutter.  Pt offered hospital admission but deferred and started on furosemide and Eliquis (first dose last night, 3/29).  Pt presents for Atrial flutter ablation with LUKE.       < from: Xray Chest 2 Views PA/Lat (03.28.23 @ 11:36) >  IMPRESSION:  Enlarged cardiac silhouette.    Mild pulmonary vascular redistribution/congestion.    Continued left basilar linear atelectasis versus scar.    < end of copied text >   87 years old male HTN, HLD, CAD Hx CABG (2000), PCI 2005, s/p diagnostic cath in  2022, with severe disease to distal LAD and distal LIMA that was not able to be intervened, CKD, SVT in the past AVNRT ablation in 2014 by Dr Smyth, Hx ablation at [facility] in 2013, thoracic aortic aneurysm repair (stent placement 1999) recently see by his cardiologist Dr White for 2-3 week of SOB, "loose cough", non productive and complaints of palpitations and was in atrial flutter on the EKG with +3LE edema and bibasilar pulmonary crackles. At this time Dr Joshua was called in to evaluated and recommended ablation for what appears to be typical atrial flutter.  Pt offered hospital admission but deferred and started on furosemide 40mg (3 doses only given) and Eliquis (first dose last night, 3/29).  Pt presents for Atrial flutter ablation with LUKE.       < from: Xray Chest 2 Views PA/Lat (03.28.23 @ 11:36) >  IMPRESSION:  Enlarged cardiac silhouette.    Mild pulmonary vascular redistribution/congestion.    Continued left basilar linear atelectasis versus scar.    < end of copied text >

## 2023-03-30 NOTE — ASU DISCHARGE PLAN (ADULT/PEDIATRIC) - MODE OF TRANSPORTATION
Vital Signs Last 24 Hrs  T(C): 36.7 (18 Dec 2018 07:18), Max: 38.1 (18 Dec 2018 01:39)  T(F): 98.1 (18 Dec 2018 07:18), Max: 100.5 (18 Dec 2018 01:39)  HR: 77 (18 Dec 2018 09:07) (75 - 104)  BP: 111/64 (18 Dec 2018 09:12) (108/53 - 131/69)  BP(mean): --  RR: 18 (18 Dec 2018 09:07) (16 - 20)  SpO2: 99% (18 Dec 2018 09:07) (97% - 100%)    PHYSICAL EXAM:  Constitutional: resting in bed with no acute distress  Respiratory:  unlabored breathing on room air   Gastrointestinal: Abdomen soft, non distended, tender to palpation over the lower abdomen but more so over the right quadrant with rebound and guarding, + Rovsing sign   Extremities:  No edema, no calf tenderness
Ambulatory

## 2023-03-30 NOTE — ASU DISCHARGE PLAN (ADULT/PEDIATRIC) - PROVIDER TOKENS
PROVIDER:[TOKEN:[[id number]],SCHEDULEDAPPT:[05/03/2023],SCHEDULEDAPPTTIME:[12:30 AM],ESTABLISHEDPATIENT:[T]]

## 2023-03-30 NOTE — PRE-ANESTHESIA EVALUATION ADULT - NSRADCARDRESULTSFT_GEN_ALL_CORE
< from: Transthoracic Echocardiogram (03.28.23 @ 10:29) >    CONCLUSIONS:  1. Mitral annular calcification, otherwise normal mitral  valve. Mild-moderate mitral regurgitation.  2. Mildly dilated left atrium.  LA volume index = 35 cc/m2.  3. Mild concentric left ventricular hypertrophy.  4. Endocardium not well visualized; grossly mild global  left ventricular systolic dysfunction.  5. The right ventricle is not well visualized; grossly  normal right ventricular systolic function.  6. Estimated right ventricular systolic pressure equals 45  mm Hg, assuming right atrial pressure equals 10 mm Hg,  consistent with mild pulmonary hypertension.  *** Compared with echocardiogram of 2/18/2020, no  significant changes noted.    < end of copied text >

## 2023-03-30 NOTE — ASU DISCHARGE PLAN (ADULT/PEDIATRIC) - ASU DC SPECIAL INSTRUCTIONSFT
Please call your doctor if you have any questions and call tomorrow to make a follow-up appointment within one week to discuss your treatment plan.    For 24 hours after your procedure, please:  - Stay home and rest.  - Do not drive, operate machinery, or use power tools  - Do not drink any alcoholic beverages, not even beer or wine  - Do not make important personal or business decisions    Your Diet:  - Follow a heart-healthy diet or a special diet recommended by your doctor    Your Medicines:  - Please take your medication as prescribed. Additional instructions will be provided to you    Things to know:  - You may have mild soreness at the skin area overlying your chest and back  - Special skin cream can be ordered to make you more comfortable    Call your doctor if:  - You have a rapid heartbeat or palpitations; dizziness or lightheadedness    Call 911 IMMEDIATELY if you have:  - Chest or belly pain  - Severe bleeding from your mouth or throat  - Trouble breathing  - Fainting or passing out

## 2023-03-30 NOTE — ASU DISCHARGE PLAN (ADULT/PEDIATRIC) - CARE PROVIDER_API CALL
Complex Requirements: Extensive Undermining Performed?: No Barber Joshua)  Cardiac Electrophysiology; Cardiology  [address] [place] [address]  Phone: [phone]  Fax: [phone]  Established Patient  Scheduled Appointment: 05/03/2023 12:30 AM

## 2023-03-30 NOTE — CHART NOTE - NSCHARTNOTEFT_GEN_A_CORE
88 y/o male w/ PMHx of HTN, HLD, CAD Hx CABG (2000), PCI 2005, s/p diagnostic cath in  2022, with severe disease to distal LAD and distal LIMA that was not able to be intervened, CKD, SVT in the past AVNRT ablation in 2014 by Dr Smyth, Hx ablation at [facility] in 2013, thoracic aortic aneurysm repair (stent placement 1999) initially presented for Atrial flutter ablation w/ LUKE. Patient w/ severe LM disease, thus pt only underwent LUKE/cardioversion.     Now s/p successful LUKE/cardioversion w/ sedation.     Plan:  - STAT Eliquis 2.5mg x1 (ordered). Continue on discharge  - START Amiodarone 400mg BID x1 week (first dose STAT), then Amiodarone 200mg BID x1 week then Amiodarone 200mg QD. Prescription sent to [facility]  - Post EKG NSR at 66bpm  - Discontinue Plavix on discharge    Above plan discussed w/ Dr. Tres Carr PA-C  PACU  [id number]
Plan changed for today.  Pt to have LUKE/DCCV today with antiarrhythmic medication trial instead of planned ablation.  Dr Joshua at bedside with pt.

## 2023-03-31 PROCEDURE — 93005 ELECTROCARDIOGRAM TRACING: CPT

## 2023-03-31 PROCEDURE — 92960 CARDIOVERSION ELECTRIC EXT: CPT

## 2023-03-31 PROCEDURE — 93320 DOPPLER ECHO COMPLETE: CPT

## 2023-03-31 PROCEDURE — 86900 BLOOD TYPING SEROLOGIC ABO: CPT

## 2023-03-31 PROCEDURE — 86901 BLOOD TYPING SEROLOGIC RH(D): CPT

## 2023-03-31 PROCEDURE — 93312 ECHO TRANSESOPHAGEAL: CPT

## 2023-03-31 PROCEDURE — 93325 DOPPLER ECHO COLOR FLOW MAPG: CPT | Mod: 26

## 2023-03-31 PROCEDURE — 86850 RBC ANTIBODY SCREEN: CPT

## 2023-03-31 PROCEDURE — 93320 DOPPLER ECHO COMPLETE: CPT | Mod: 26

## 2023-03-31 PROCEDURE — 93312 ECHO TRANSESOPHAGEAL: CPT | Mod: 26

## 2023-03-31 PROCEDURE — 93325 DOPPLER ECHO COLOR FLOW MAPG: CPT

## 2023-03-31 RX ORDER — ATORVASTATIN CALCIUM 80 MG/1
1 TABLET, FILM COATED ORAL
Refills: 0 | DISCHARGE

## 2023-03-31 RX ORDER — APIXABAN 2.5 MG/1
1 TABLET, FILM COATED ORAL
Refills: 0 | DISCHARGE

## 2023-03-31 RX ORDER — ALLOPURINOL 300 MG
1 TABLET ORAL
Refills: 0 | DISCHARGE

## 2023-03-31 RX ORDER — FUROSEMIDE 40 MG
1 TABLET ORAL
Refills: 0 | DISCHARGE

## 2023-04-19 RX ORDER — CHLORTHALIDONE 25 MG/1
25 TABLET ORAL
Qty: 90 | Refills: 3 | Status: DISCONTINUED | COMMUNITY
Start: 2022-03-04 | End: 2023-04-19

## 2023-04-19 RX ORDER — CHLORTHALIDONE 25 MG/1
25 TABLET ORAL DAILY
Qty: 90 | Refills: 3 | Status: ACTIVE | COMMUNITY
Start: 2023-04-19 | End: 1900-01-01

## 2023-05-03 ENCOUNTER — APPOINTMENT (OUTPATIENT)
Dept: ELECTROPHYSIOLOGY | Facility: CLINIC | Age: 88
End: 2023-05-03
Payer: MEDICARE

## 2023-05-03 ENCOUNTER — NON-APPOINTMENT (OUTPATIENT)
Age: 88
End: 2023-05-03

## 2023-05-03 VITALS
DIASTOLIC BLOOD PRESSURE: 77 MMHG | SYSTOLIC BLOOD PRESSURE: 162 MMHG | OXYGEN SATURATION: 96 % | WEIGHT: 200 LBS | BODY MASS INDEX: 28 KG/M2 | HEART RATE: 60 BPM | HEIGHT: 71 IN

## 2023-05-03 DIAGNOSIS — I48.19 OTHER PERSISTENT ATRIAL FIBRILLATION: ICD-10-CM

## 2023-05-03 PROCEDURE — 93000 ELECTROCARDIOGRAM COMPLETE: CPT

## 2023-05-03 PROCEDURE — 99214 OFFICE O/P EST MOD 30 MIN: CPT

## 2023-05-03 RX ORDER — FUROSEMIDE 40 MG/1
40 TABLET ORAL
Qty: 3 | Refills: 0 | Status: DISCONTINUED | COMMUNITY
Start: 2023-03-27 | End: 2023-05-03

## 2023-05-03 RX ORDER — ALLOPURINOL 300 MG/1
300 TABLET ORAL DAILY
Refills: 0 | Status: ACTIVE | COMMUNITY
Start: 2022-04-19

## 2023-05-03 RX ORDER — SPIRONOLACTONE 25 MG/1
25 TABLET ORAL
Qty: 90 | Refills: 1 | Status: DISCONTINUED | COMMUNITY
Start: 2023-05-03 | End: 2023-05-03

## 2023-05-03 RX ORDER — FLUTICASONE PROPIONATE 50 UG/1
50 SPRAY, METERED NASAL DAILY
Qty: 1 | Refills: 0 | Status: DISCONTINUED | COMMUNITY
Start: 2018-03-14 | End: 2023-05-03

## 2023-05-03 RX ORDER — COLCHICINE 0.6 MG/1
0.6 TABLET, FILM COATED ORAL
Refills: 0 | Status: ACTIVE | COMMUNITY
Start: 2022-04-19

## 2023-05-03 RX ORDER — AMIODARONE HYDROCHLORIDE 200 MG/1
200 TABLET ORAL DAILY
Qty: 30 | Refills: 1 | Status: ACTIVE | COMMUNITY
Start: 2023-05-03

## 2023-05-03 NOTE — DISCUSSION/SUMMARY
[FreeTextEntry1] : In summary, this is an 87-year-old man with history of atrial fibrillation and atrial flutter now status post cardioversion with initiation of amiodarone.  Continue to see him doing well today maintaining sinus rhythm.  I encouraged him to begin therapy with chlorthalidone for management of his mild lower extremity edema.  He knows to call the office should he note onset of recurrent arrhythmia.  Otherwise, he will follow-up in general cardiology clinic with electrophysiology as needed.\par \par He appeared to understand the whole discussion and verbalized that all of his questions were answered to his satisfaction.\par \par Thank you for allowing me to be involved in the care of this pleasant man. Please feel free to contact me with any questions.\par \par \par \par Barber Joshua MD\par  of Cardiology\par Electrophysiology Section\[address], [address] [place] [id number]\[facility] Office: [phone]\par Fax: [phone]\[facility]  [EKG obtained to assist in diagnosis and management of assessed problem(s)] : EKG obtained to assist in diagnosis and management of assessed problem(s)

## 2023-05-03 NOTE — PHYSICAL EXAM

## 2023-05-03 NOTE — HISTORY OF PRESENT ILLNESS
[FreeTextEntry1] : Mr. Zachary Lester presents today to the cardiac electrophysiology clinic.  He is a 87-year-old man with a history of CAD s/p CABG s/p C in 2022 showing severe calcific disease of the distal LAD and LIMA (medically managed), HTN, HL, CKD, and atrial fibrillation s/p prior ablation at outside medical center. He presented in 3/2023 with worsening volume overload and SOB in the context of new onset rapid atrial flutter. He was brought for ablation but by the time of his arrival had degenerated into rapid atrial fibrillation. Given his age and advanced CAD, we decided instead to performe cardioversion followed by an amiodarone load.\par \par He returns now for follow up. He is tolerating amiodarone uneventfully and notes no CP, palpitations, SOB, FALLON, dizziness or syncope. He complains of mild LE edema that has not completely resolved since return to sinus. He was recently given an Rx for chlorthalidone but has not begun therapy.

## 2023-05-17 ENCOUNTER — APPOINTMENT (OUTPATIENT)
Dept: ELECTROPHYSIOLOGY | Facility: CLINIC | Age: 88
End: 2023-05-17

## 2023-07-17 NOTE — ED ADULT TRIAGE NOTE - NS ED NOTE AC HIGH RISK COUNTRIES
Writer contacted patient at number listed.  Patient states he is staying hydrated and doing sinus irrigations about every other day, when he feels like he is getting plugged.  Writer advised at this point there isn't much more to offer except surgery as noted in Dr. Morfin's last office visit note.  The appointment on 7.28.23 will discuss surgery and other options, if any.  Patient verbalized understanding.   No

## 2023-08-02 NOTE — PATIENT PROFILE ADULT - FALL HARM RISK - PATIENT NEEDS ASSISTANCE
No assistance needed Quality 138: Melanoma: Coordination Of Care: A treatment plan was communicated to the physicians providing continuing care within one month of diagnosis outlining: diagnosis, tumor thickness and a plan for surgery or alternate care. Detail Level: Detailed Quality 137: Melanoma: Continuity Of Care - Recall System: Patient information entered into a recall system that includes: target date for the next exam specified AND a process to follow up with patients regarding missed or unscheduled appointments

## 2023-11-08 ENCOUNTER — NON-APPOINTMENT (OUTPATIENT)
Age: 88
End: 2023-11-08

## 2023-11-08 ENCOUNTER — APPOINTMENT (OUTPATIENT)
Dept: CARDIOLOGY | Facility: CLINIC | Age: 88
End: 2023-11-08
Payer: MEDICARE

## 2023-11-08 VITALS
HEART RATE: 54 BPM | WEIGHT: 200 LBS | BODY MASS INDEX: 28 KG/M2 | HEIGHT: 71 IN | SYSTOLIC BLOOD PRESSURE: 150 MMHG | OXYGEN SATURATION: 96 % | DIASTOLIC BLOOD PRESSURE: 77 MMHG

## 2023-11-08 DIAGNOSIS — I25.10 ATHEROSCLEROTIC HEART DISEASE OF NATIVE CORONARY ARTERY W/OUT ANGINA PECTORIS: ICD-10-CM

## 2023-11-08 DIAGNOSIS — R07.89 OTHER CHEST PAIN: ICD-10-CM

## 2023-11-08 DIAGNOSIS — Z00.00 ENCOUNTER FOR GENERAL ADULT MEDICAL EXAMINATION W/OUT ABNORMAL FINDINGS: ICD-10-CM

## 2023-11-08 DIAGNOSIS — E87.70 FLUID OVERLOAD, UNSPECIFIED: ICD-10-CM

## 2023-11-08 DIAGNOSIS — N18.9 CHRONIC KIDNEY DISEASE, UNSPECIFIED: ICD-10-CM

## 2023-11-08 DIAGNOSIS — E78.5 HYPERLIPIDEMIA, UNSPECIFIED: ICD-10-CM

## 2023-11-08 PROCEDURE — 99215 OFFICE O/P EST HI 40 MIN: CPT

## 2023-11-08 RX ORDER — HYDRALAZINE HYDROCHLORIDE 50 MG/1
50 TABLET ORAL TWICE DAILY
Qty: 180 | Refills: 3 | Status: ACTIVE | COMMUNITY
Start: 2023-04-19 | End: 1900-01-01

## 2023-11-13 LAB
ALBUMIN SERPL ELPH-MCNC: 4.3 G/DL
ALP BLD-CCNC: 84 U/L
ALT SERPL-CCNC: 17 U/L
ANION GAP SERPL CALC-SCNC: 13 MMOL/L
AST SERPL-CCNC: 16 U/L
BILIRUB SERPL-MCNC: 0.5 MG/DL
BUN SERPL-MCNC: 39 MG/DL
CALCIUM SERPL-MCNC: 9.3 MG/DL
CHLORIDE SERPL-SCNC: 103 MMOL/L
CO2 SERPL-SCNC: 24 MMOL/L
CREAT SERPL-MCNC: 2 MG/DL
EGFR: 32 ML/MIN/1.73M2
ESTIMATED AVERAGE GLUCOSE: 120 MG/DL
GLUCOSE SERPL-MCNC: 95 MG/DL
HBA1C MFR BLD HPLC: 5.8 %
HCT VFR BLD CALC: 38.1 %
HGB BLD-MCNC: 12.8 G/DL
MCHC RBC-ENTMCNC: 32.5 PG
MCHC RBC-ENTMCNC: 33.6 GM/DL
MCV RBC AUTO: 96.7 FL
NT-PROBNP SERPL-MCNC: 346 PG/ML
PLATELET # BLD AUTO: 150 K/UL
POTASSIUM SERPL-SCNC: 4.4 MMOL/L
PROT SERPL-MCNC: 7 G/DL
RBC # BLD: 3.94 M/UL
RBC # FLD: 14.1 %
SODIUM SERPL-SCNC: 139 MMOL/L
TSH SERPL-ACNC: 0.37 UIU/ML
WBC # FLD AUTO: 6.32 K/UL

## 2023-11-16 PROBLEM — R07.89 CHEST DISCOMFORT: Status: ACTIVE | Noted: 2023-11-16

## 2023-11-16 PROBLEM — E87.70 FLUID OVERLOAD: Status: ACTIVE | Noted: 2023-03-28

## 2023-11-16 PROBLEM — N18.9 CHRONIC KIDNEY DISEASE (CKD): Status: ACTIVE | Noted: 2022-04-19

## 2023-11-18 ENCOUNTER — APPOINTMENT (OUTPATIENT)
Dept: CARDIOLOGY | Facility: CLINIC | Age: 88
End: 2023-11-18
Payer: MEDICARE

## 2023-11-18 DIAGNOSIS — I10 ESSENTIAL (PRIMARY) HYPERTENSION: ICD-10-CM

## 2023-11-18 DIAGNOSIS — I48.0 PAROXYSMAL ATRIAL FIBRILLATION: ICD-10-CM

## 2023-11-18 PROCEDURE — 93306 TTE W/DOPPLER COMPLETE: CPT

## 2023-11-26 PROBLEM — I48.0 PAROXYSMAL ATRIAL FIBRILLATION: Status: ACTIVE | Noted: 2023-11-16

## 2023-11-29 ENCOUNTER — NON-APPOINTMENT (OUTPATIENT)
Age: 88
End: 2023-11-29

## 2023-12-18 ENCOUNTER — APPOINTMENT (OUTPATIENT)
Dept: CARDIOLOGY | Facility: CLINIC | Age: 88
End: 2023-12-18

## 2023-12-25 NOTE — H&P CARDIOLOGY - SOURCE
Luverne Stephens .    Juan Carlos Almanzar MD  Medicine Service, [facility] TEAM 1  [facility]  Securely message with Messagemind (Patient

## 2024-01-04 NOTE — PRE-ANESTHESIA EVALUATION ADULT - HEIGHT IN FEET
[facility] MEDICINE RESIDENCY   NURSING HOME VISIT     Room Number: 146        Subjective   SUBJECTIVE      Blake Gallo is a 81 year old male nursing home Cash Ansari MD  ( 1/4/2024 )    [facility]   5

## 2024-08-28 ENCOUNTER — NON-APPOINTMENT (OUTPATIENT)
Age: 89
End: 2024-08-28

## 2024-08-29 ENCOUNTER — APPOINTMENT (OUTPATIENT)
Dept: CARDIOLOGY | Facility: CLINIC | Age: 89
End: 2024-08-29
Payer: MEDICARE

## 2024-08-29 ENCOUNTER — NON-APPOINTMENT (OUTPATIENT)
Age: 89
End: 2024-08-29

## 2024-08-29 ENCOUNTER — APPOINTMENT (OUTPATIENT)
Dept: CARDIOLOGY | Facility: CLINIC | Age: 89
End: 2024-08-29

## 2024-08-29 VITALS
HEART RATE: 46 BPM | OXYGEN SATURATION: 96 % | BODY MASS INDEX: 26.6 KG/M2 | DIASTOLIC BLOOD PRESSURE: 68 MMHG | SYSTOLIC BLOOD PRESSURE: 149 MMHG | HEIGHT: 71 IN | WEIGHT: 190 LBS

## 2024-08-29 VITALS — SYSTOLIC BLOOD PRESSURE: 138 MMHG | DIASTOLIC BLOOD PRESSURE: 76 MMHG

## 2024-08-29 DIAGNOSIS — M62.830 MUSCLE SPASM OF BACK: ICD-10-CM

## 2024-08-29 DIAGNOSIS — M10.9 GOUT, UNSPECIFIED: ICD-10-CM

## 2024-08-29 DIAGNOSIS — M62.838 OTHER MUSCLE SPASM: ICD-10-CM

## 2024-08-29 DIAGNOSIS — R00.1 BRADYCARDIA, UNSPECIFIED: ICD-10-CM

## 2024-08-29 DIAGNOSIS — R07.89 OTHER CHEST PAIN: ICD-10-CM

## 2024-08-29 DIAGNOSIS — R60.0 LOCALIZED EDEMA: ICD-10-CM

## 2024-08-29 PROCEDURE — 99214 OFFICE O/P EST MOD 30 MIN: CPT

## 2024-08-29 PROCEDURE — 93306 TTE W/DOPPLER COMPLETE: CPT

## 2024-08-29 PROCEDURE — 93000 ELECTROCARDIOGRAM COMPLETE: CPT

## 2024-08-29 NOTE — PHYSICAL EXAM
[Normal S1, S2] : normal S1, S2 [No Rub] : no rub [No Gallop] : no gallop [Soft] : abdomen soft [Non Tender] : non-tender [Normal Gait] : normal gait [No Cyanosis] : no cyanosis [No Clubbing] : no clubbing [Normal Radial B/L] : normal radial B/L [Edema ___] : edema [unfilled] [No Rash] : no rash [Normal] : alert and oriented, normal memory [de-identified] : MEKA 3/6  [de-identified] : bradycardia [de-identified] : + rales/crackles base L

## 2024-08-29 NOTE — REVIEW OF SYSTEMS
[Feeling Fatigued] : feeling fatigued [Fever] : no fever [Chills] : no chills [Blurry Vision] : no blurred vision [SOB] : no shortness of breath [Dyspnea on exertion] : not dyspnea during exertion [Chest Discomfort] : chest discomfort [Lower Ext Edema] : lower extremity edema [Syncope] : no syncope [Abdominal Pain] : no abdominal pain [Nausea] : no nausea [Vomiting] : no vomiting [Diarrhea] : diarrhea [Joint Pain] : joint pain [Rash] : no rash [Dizziness] : no dizziness [Numbness (Hypoesthesia)] : no numbness [Convulsions] : no convulsions [Weakness] : no weakness [Speech Disturbance] : no speech disturbance [Confusion] : no confusion was observed [Under Stress] : under stress [Suicidal] : not suicidal [Easy Bleeding] : no tendency for easy bleeding [Easy Bruising] : a tendency for easy bruising [Negative] : ENT

## 2024-08-29 NOTE — HISTORY OF PRESENT ILLNESS
[FreeTextEntry1] : 89 yo man with CABG, CAD, Afib s/p ablation on amio and AC, CKD, who presents today for an acute visit.  Pt reports neck pain radiating to the arms, feels like a spasm, both sides, has been happening for the two past two months, now with back pain last night (upper back), resolved by morning, also associated with some radiation to the chest right below the chest area.  Daughter has brain cancer and bedridden at the "rehab", he has been visiting her every day.  Admits to not sleeping well and feeling tired.  Also notices LE edema for the past month or so, some fatigue, no SOB, no FALLON that he noticed.  No CP on exertion.  Had maybe a URI two weeks ago, did not do COVID testing but definitely recalls symptoms of the URI.  Has a PMD at the [facility].

## 2024-08-30 LAB
ALBUMIN SERPL ELPH-MCNC: 4.2 G/DL
ALP BLD-CCNC: 104 U/L
ALT SERPL-CCNC: 27 U/L
ANION GAP SERPL CALC-SCNC: 14 MMOL/L
AST SERPL-CCNC: 21 U/L
BILIRUB SERPL-MCNC: 0.4 MG/DL
BUN SERPL-MCNC: 35 MG/DL
CALCIUM SERPL-MCNC: 9 MG/DL
CHLORIDE SERPL-SCNC: 107 MMOL/L
CO2 SERPL-SCNC: 21 MMOL/L
CREAT SERPL-MCNC: 1.86 MG/DL
EGFR: 35 ML/MIN/1.73M2
GLUCOSE SERPL-MCNC: 88 MG/DL
HCT VFR BLD CALC: 37.1 %
HGB BLD-MCNC: 12.2 G/DL
MCHC RBC-ENTMCNC: 32.6 PG
MCHC RBC-ENTMCNC: 32.9 GM/DL
MCV RBC AUTO: 99.2 FL
NT-PROBNP SERPL-MCNC: 727 PG/ML
PLATELET # BLD AUTO: 164 K/UL
POTASSIUM SERPL-SCNC: 4.9 MMOL/L
PROCALCITONIN SERPL-MCNC: 0.08 NG/ML
PROT SERPL-MCNC: 6.9 G/DL
RBC # BLD: 3.74 M/UL
RBC # FLD: 15.9 %
SODIUM SERPL-SCNC: 142 MMOL/L
WBC # FLD AUTO: 5.88 K/UL

## 2024-08-30 RX ORDER — FUROSEMIDE 40 MG/1
40 TABLET ORAL
Qty: 14 | Refills: 0 | Status: ACTIVE | COMMUNITY
Start: 2024-08-30 | End: 1900-01-01

## 2024-09-04 ENCOUNTER — NON-APPOINTMENT (OUTPATIENT)
Age: 89
End: 2024-09-04

## 2024-10-14 ENCOUNTER — APPOINTMENT (OUTPATIENT)
Dept: CARDIOLOGY | Facility: CLINIC | Age: 89
End: 2024-10-14
Payer: MEDICARE

## 2024-10-14 VITALS
HEART RATE: 47 BPM | WEIGHT: 188 LBS | SYSTOLIC BLOOD PRESSURE: 146 MMHG | HEIGHT: 71 IN | DIASTOLIC BLOOD PRESSURE: 65 MMHG | BODY MASS INDEX: 26.32 KG/M2 | OXYGEN SATURATION: 98 %

## 2024-10-14 DIAGNOSIS — E78.5 HYPERLIPIDEMIA, UNSPECIFIED: ICD-10-CM

## 2024-10-14 DIAGNOSIS — I25.10 ATHEROSCLEROTIC HEART DISEASE OF NATIVE CORONARY ARTERY W/OUT ANGINA PECTORIS: ICD-10-CM

## 2024-10-14 DIAGNOSIS — F43.9 REACTION TO SEVERE STRESS, UNSPECIFIED: ICD-10-CM

## 2024-10-14 DIAGNOSIS — R60.0 LOCALIZED EDEMA: ICD-10-CM

## 2024-10-14 DIAGNOSIS — I10 ESSENTIAL (PRIMARY) HYPERTENSION: ICD-10-CM

## 2024-10-14 PROCEDURE — G2211 COMPLEX E/M VISIT ADD ON: CPT

## 2024-10-14 PROCEDURE — 93000 ELECTROCARDIOGRAM COMPLETE: CPT

## 2024-10-14 PROCEDURE — 99214 OFFICE O/P EST MOD 30 MIN: CPT

## 2024-10-14 RX ORDER — COLCHICINE 0.6 MG/1
0.6 CAPSULE ORAL
Refills: 0 | Status: ACTIVE | COMMUNITY
Start: 2024-10-14

## 2024-10-14 RX ORDER — FUROSEMIDE 40 MG/1
40 TABLET ORAL
Refills: 0 | Status: ACTIVE | COMMUNITY
Start: 2024-10-14

## 2024-10-14 NOTE — PHYSICAL EXAM
[Normal S1, S2] : normal S1, S2 [No Rub] : no rub [Normal] : alert and oriented, normal memory [de-identified] : soft HSM

## 2024-10-14 NOTE — DISCUSSION/SUMMARY
[EKG obtained to assist in diagnosis and management of assessed problem(s)] : EKG obtained to assist in diagnosis and management of assessed problem(s) [FreeTextEntry1] : In summary, Mr. Kuhn is an 88 y/o male seen today for cardiovascular follow up, seen for several years for continued management  CAD, afib s/p ablation on amiodarone, HTN, hyperlipidemia, SVT,CKD ( Cre 1.8).  Most recently developed recurrent anginal symptoms including chest discomfort , arm pain x 2 months but increased in severity  seen urgently by  Dr. Doe ( cardiology), s/p URI x 2 weeks prior, no COVID test, ultimately dx with pneumonia, treated with antibiotics and improved.  At the time, Cre 1.8, BNP elevated 727, Procalcitonin WNL.  TTE 8/2024:  moderate LV diastolic dysfunction, elevated filling pressures, no AI, mild MR, borderline pulm HTN, LV systolic function normal, no significant change from TTE 2023 Of note is stress of daughter's illness of Brain CA currently at [facility] with  plan for discharge home with Mr. Kuhn with 24 hour care Patient feels well currenlty, no CP, no SOB , dizziness, palpitations, edema, syncope. Exercises x 1 hour daily. Hydrates and eats well.  BP today : 146/65, HR 50 EKG: unchanged, SB with 1st degree AV block sat 98% RA Reviewed meds  and plan to continue Lasix 40 mg but every other day.  Sees Dr. Munson for management CKD  # CAD, afib, HTN, HLD LV diastolic dysfunction  -Continues current medical regimen : -Lipitor  80 mg daily  control HLD, f/u labwork with PMD -Continues Eliquis 2.5 mg bid for hx paroxysmal afib/ Cre 1.8 and Metoprolol ER 25 mg daily -Continues antihypertensive regimen: hydralazine 50 mg bid, Lisinopril 20 mg daily Encouraged patient to continue healthy exercise and eating habits, focusing on a Mediterranean style of eating and aiming for the recommended 150 minutes per week of moderate physical activity.  #Adverse Cardiovascular risk factors, stress  #Adverse Cardiovascular Risk Factors - Encouraged the patient to find healthy outlets and coping mechanisms to help manage stress, such as physical activity/exercise, reducing workload if possible, spending time with family and friends, engaging in an enjoyable hobby, or using meditation or mindfulness techniques.  f/u  months

## 2024-10-14 NOTE — REASON FOR VISIT
[Symptom and Test Evaluation] : symptom and test evaluation [FreeTextEntry1] : Mr. Kuhn is an 88 y/o male seen today for cardiovascular follow up, seen for several years for continued management  CAD, afib s/p ablation on amiodarone, HTN, hyperlipidemia, SVT,CKD ( Cre 1.8).  Most recently developed recurrent anginal symptoms including chest discomfort , arm pain x 2 months but increased in severity  seen urgently by  Dr. Doe ( cardiology), s/p URI x 2 weeks prior, no COVID test, ultimately dx with pneumonia, treated with antibiotics and improved.  At the time, Cre 1.8, BNP elevated 727, Procalcitonin WNL.  TTE 8/2024:  moderate LV diastolic dysfunction, elevated filling pressures, no AI, mild MR, borderline pulm HTN, LV systolic function normal, no significant change from TTE 2023 Of note is stress of daughter's illness of Brain CA currently at [facility] with  plan for discharge home with Mr. Kuhn with 24 hour care Patient feels well currenlty, no CP, no SOB , dizziness, palpitations, edema, syncope. Exercises x 1 hour daily. Hydrates and eats well.  BP today : 146/65, HR 50 EKG: unchanged, SB with 1st degree AV block sat 98% RA Reviewed meds  and plan to continue Lasix 40 mg but every other day.  Sees Dr. Munson for management CKD

## 2024-10-14 NOTE — PHYSICAL EXAM
[Normal S1, S2] : normal S1, S2 [No Rub] : no rub [Normal] : alert and oriented, normal memory [de-identified] : soft HSM

## 2024-10-14 NOTE — REASON FOR VISIT
[Symptom and Test Evaluation] : symptom and test evaluation [FreeTextEntry1] : Mr. Kuhn is an 88 y/o male seen today for cardiovascular follow up, seen for several years for continued management  CAD, afib s/p ablation on amiodarone, HTN, hyperlipidemia, SVT,CKD ( Cre 1.8).  Most recently developed recurrent anginal symptoms including chest discomfort , arm pain x 2 months but increased in severity  seen urgently by  Dr. Doe ( cardiology), s/p URI x 2 weeks prior, no COVID test, ultimately dx with pneumonia, treated with antibiotics and improved.  At the time, Cre 1.8, BNP elevated 727, Procalcitonin WNL.  TTE 8/2024:  moderate LV diastolic dysfunction, elevated filling pressures, no AI, mild MR, borderline pulm HTN, LV systolic function normal, no significant change from TTE 2023 Of note is stress of daughter's illness of Brain CA currently at [facility] with  plan for discharge home with Mr. Kuhn with 24 hour care Patient feels well currenlty, no CP, no SOB , dizziness, palpitations, edema, syncope. Exercises x 1 hour daily. Hydrates and eats well.  BP today : 146/65, HR 50 EKG: unchanged, SB with 1st degree AV block sat 98% RA Reviewed meds  and plan to continue Lasix 40 mg but every other day.  Sees Dr. Munson for management CKD

## 2024-10-14 NOTE — ASSESSMENT
[FreeTextEntry1] : 88 yo w \par  CAD - last srping stress was abnl - 3/22/22 had cath showed signifiacnt dz ( including distal LIMA) but no interventions. WIll continue on current meds. Asked pt to see Dr Munson - labs drawn today to assess Bun/Cr.\par  Asked him not to change meds until he sees dr munson -- [facility] once daily [facility]ral[facility] along w diuretics.

## 2024-10-14 NOTE — DISCUSSION/SUMMARY
[EKG obtained to assist in diagnosis and management of assessed problem(s)] : EKG obtained to assist in diagnosis and management of assessed problem(s) [FreeTextEntry1] : In summary, Mr. Kuhn is an 89 y/o male seen today for cardiovascular follow up, seen for several years for continued management  CAD, afib s/p ablation on amiodarone, HTN, hyperlipidemia, SVT,CKD ( Cre 1.8).  Most recently developed recurrent anginal symptoms including chest discomfort , arm pain x 2 months but increased in severity  seen urgently by  Dr. Doe ( cardiology), s/p URI x 2 weeks prior, no COVID test, ultimately dx with pneumonia, treated with antibiotics and improved.  At the time, Cre 1.8, BNP elevated 727, Procalcitonin WNL.  TTE 8/2024:  moderate LV diastolic dysfunction, elevated filling pressures, no AI, mild MR, borderline pulm HTN, LV systolic function normal, no significant change from TTE 2023 Of note is stress of daughter's illness of Brain CA currently at [facility] with  plan for discharge home with Mr. Kuhn with 24 hour care Patient feels well currenlty, no CP, no SOB , dizziness, palpitations, edema, syncope. Exercises x 1 hour daily. Hydrates and eats well.  BP today : 146/65, HR 50 EKG: unchanged, SB with 1st degree AV block sat 98% RA Reviewed meds  and plan to continue Lasix 40 mg but every other day.  Sees Dr. Munson for management CKD  # CAD, afib, HTN, HLD LV diastolic dysfunction  -Continues current medical regimen : -Lipitor  80 mg daily  control HLD, f/u labwork with PMD -Continues Eliquis 2.5 mg bid for hx paroxysmal afib/ Cre 1.8 and Metoprolol ER 25 mg daily -Continues antihypertensive regimen: hydralazine 50 mg bid, Lisinopril 20 mg daily Encouraged patient to continue healthy exercise and eating habits, focusing on a Mediterranean style of eating and aiming for the recommended 150 minutes per week of moderate physical activity.  #Adverse Cardiovascular risk factors, stress  #Adverse Cardiovascular Risk Factors - Encouraged the patient to find healthy outlets and coping mechanisms to help manage stress, such as physical activity/exercise, reducing workload if possible, spending time with family and friends, engaging in an enjoyable hobby, or using meditation or mindfulness techniques.  f/u  months

## 2024-10-14 NOTE — REVIEW OF SYSTEMS
[Negative] : Heme/Lymph [Feeling Fatigued] : feeling fatigued [Under Stress] : under stress [FreeTextEntry2] : poor sleep due to stress

## 2025-03-04 ENCOUNTER — APPOINTMENT (OUTPATIENT)
Dept: CARDIOLOGY | Facility: CLINIC | Age: 89
End: 2025-03-04
Payer: MEDICARE

## 2025-03-04 ENCOUNTER — NON-APPOINTMENT (OUTPATIENT)
Age: 89
End: 2025-03-04

## 2025-03-04 VITALS — SYSTOLIC BLOOD PRESSURE: 172 MMHG | DIASTOLIC BLOOD PRESSURE: 78 MMHG

## 2025-03-04 VITALS
TEMPERATURE: 97 F | SYSTOLIC BLOOD PRESSURE: 175 MMHG | BODY MASS INDEX: 26.32 KG/M2 | HEIGHT: 71 IN | HEART RATE: 46 BPM | WEIGHT: 188 LBS | OXYGEN SATURATION: 96 % | DIASTOLIC BLOOD PRESSURE: 81 MMHG

## 2025-03-04 DIAGNOSIS — E78.5 HYPERLIPIDEMIA, UNSPECIFIED: ICD-10-CM

## 2025-03-04 DIAGNOSIS — N18.9 CHRONIC KIDNEY DISEASE, UNSPECIFIED: ICD-10-CM

## 2025-03-04 DIAGNOSIS — Z86.79 PERSONAL HISTORY OF OTHER DISEASES OF THE CIRCULATORY SYSTEM: ICD-10-CM

## 2025-03-04 PROCEDURE — 93000 ELECTROCARDIOGRAM COMPLETE: CPT

## 2025-03-04 PROCEDURE — G2211 COMPLEX E/M VISIT ADD ON: CPT

## 2025-03-04 PROCEDURE — 99215 OFFICE O/P EST HI 40 MIN: CPT

## 2025-03-04 NOTE — ASSESSMENT
[FreeTextEntry1] : 86 yo w \par  CAD - last srping stress was abnl - 3/22/22 had cath showed signifiacnt dz ( including distal LIMA) but no interventions. WIll continue on current meds. Asked pt to see Dr Munson - labs drawn today to assess Bun/Cr.\par  Asked him not to change meds until he sees dr munson -- [facility] once daily hydralazine along w diuretics.

## 2025-03-05 NOTE — PHYSICAL EXAM
[Normal S1, S2] : normal S1, S2 [No Rub] : no rub [Normal] : alert and oriented, normal memory [de-identified] : soft HSM

## 2025-03-05 NOTE — HISTORY OF PRESENT ILLNESS
[FreeTextEntry1] : Mr. Kuhn is an 89 y/o male seen today for cardiovascular follow up, seen for several years for continued management CAD, afib s/p ablation on amiodarone, HTN, hyperlipidemia, SVT,CKD ( Cre 1.8).  Most recently developed recurrent anginal symptoms including chest discomfort , arm pain x 2 months but increased in severity seen urgently by  Dr. Doe ( cardiology), s/p URI x 2 weeks prior, no COVID test, ultimately dx with pneumonia, treated with antibiotics and improved.  At the time, Cre 1.8, BNP elevated 727, Procalcitonin WNL.  TTE 8/2024:  moderate LV diastolic dysfunction, elevated filling pressures, no AI, mild MR, borderline pulm HTN, LV systolic function normal, no significant change from TTE 2023 Of note is stress of daughter's illness of Brain CA currently at [facility] with  plan for discharge home with Mr. Kuhn with 24 hour care Patient feels well currently, no CP, no SOB , dizziness, palpitations, edema, syncope. Exercises x 1 hour daily. Hydrates and eats well.  BP today : 146/65, HR 50 EKG: unchanged, SB with 1st degree AV block sat 98% RA Reviewed meds and plan to continue Lasix 40 mg but every other day.  Sees Dr. Munson for management CKD  Interval Note March 4, 2025  Mr. Kuhn is now 89 years old and returns for a cardiovascular evaluation. 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. BP is now in poor control. Discussed the concern for too many clinicians making changes to his medication.   There is concern for his EKG that is now demonstrating significant bradycardia with conduction issues.   Unfortunately, Mr. Kuhn is having difficulty with medical compliance at this time due to his extreme personal issues with his family. Patient's daughter is suffering from terminal brain cancer and is now being cared for in his home with the aid of home health aides 24 hours per day.   He does complain of pain at the back of his neck and across the back of his shoulders.   Reviewed cardiac cath from March of 2022: **Showed severe disease in the distal LAD and distal LIMIA that is not intervenale Mild disease in the RCA Advised to treated CAD medically at that time.   There is great concern that patient is not able to care for himself at this time. Supporting patient as much as possible.

## 2025-03-05 NOTE — CARDIOLOGY SUMMARY
[de-identified] : 3/4/2025 Sinus bradycardia, non specific QRS, widening and anterior fascicular shantel @ 44 bpm   [de-identified] : 9/4/2024 Normal LV systolic function Mid inferior wall appears hypokinetic Moderate (grade 2) LV diastolic dysfunction with elevated LV filling pressure RV is normal size and function LA mildly dilated No aortic valve stenosis No AR Mild MR Borderline pulmonary hypertension

## 2025-03-05 NOTE — REVIEW OF SYSTEMS
[Feeling Fatigued] : feeling fatigued [Under Stress] : under stress [Negative] : Heme/Lymph [FreeTextEntry2] : poor sleep due to stress

## 2025-03-05 NOTE — DISCUSSION/SUMMARY
[EKG obtained to assist in diagnosis and management of assessed problem(s)] : EKG obtained to assist in diagnosis and management of assessed problem(s) [FreeTextEntry1] : Mr. Kuhn is now 89 years old and returns for a cardiovascular evaluation. 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 Fredy Munson, nephrology specialist that is involved with this patient's care.   Decision was made to have Dr. Munson review and once again adjust medication with consideration of   kidney disease.    Patient was sent immediately to his office and will be seen this afternoon.  #Hyperlipidemia   Patient reports that [facility].   Will try to obtain   Continuing on Atorvastatin 80 mg QHS  #Severe sinus bradycardia/ Hx of Afib   No evidence of afib   Continuing on Amiodarone 200 mg QD   Continuing on Eliquis 2.5 mg BID    - Encouraged patient to participate in  healthy eating habits, focusing on a Mediterranean style of eating.  - Encouraged the patient to find healthy outlets and coping mechanisms to help manage stress, such as spending time with family and friends, engaging in an enjoyable hobby, or using meditation or mindfulness techniques.  There is great concern for Mr. Kuhn in terms of his compliance with medication and treatment. Will follow up in one month.

## 2025-03-05 NOTE — PHYSICAL EXAM
[Normal S1, S2] : normal S1, S2 [No Rub] : no rub [Normal] : alert and oriented, normal memory [de-identified] : soft HSM

## 2025-03-05 NOTE — HISTORY OF PRESENT ILLNESS
[FreeTextEntry1] : Mr. Kuhn is an 89 y/o male seen today for cardiovascular follow up, seen for several years for continued management CAD, afib s/p ablation on amiodarone, HTN, hyperlipidemia, SVT,CKD ( Cre 1.8).  Most recently developed recurrent anginal symptoms including chest discomfort , arm pain x 2 months but increased in severity seen urgently by  Dr. Doe ( cardiology), s/p URI x 2 weeks prior, no COVID test, ultimately dx with pneumonia, treated with antibiotics and improved.  At the time, Cre 1.8, BNP elevated 727, Procalcitonin WNL.  TTE 8/2024:  moderate LV diastolic dysfunction, elevated filling pressures, no AI, mild MR, borderline pulm HTN, LV systolic function normal, no significant change from TTE 2023 Of note is stress of daughter's illness of Brain CA currently at [facility] with  plan for discharge home with Mr. Kuhn with 24 hour care Patient feels well currently, no CP, no SOB , dizziness, palpitations, edema, syncope. Exercises x 1 hour daily. Hydrates and eats well.  BP today : 146/65, HR 50 EKG: unchanged, SB with 1st degree AV block sat 98% RA Reviewed meds and plan to continue Lasix 40 mg but every other day.  Sees Dr. Munson for management CKD  Interval Note March 4, 2025  Mr. Kuhn is now 89 years old and returns for a cardiovascular evaluation. 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 his medication. BP is now in poor control. Discussed the concern for too many clinicians making changes to his medication.   There is concern for his EKG that is now demonstrating significant bradycardia with conduction issues.   Unfortunately, Mr. Kuhn is having difficulty with medical compliance at this time due to his extreme personal issues with his family. Patient's daughter is suffering from terminal brain cancer and is now being cared for in his home with the aid of home health aides 24 hours per day.   He does complain of pain at the back of his neck and across the back of his shoulders.   Reviewed cardiac cath from March of 2022: **Showed severe disease in the distal LAD and distal LIMIA that is not intervenale Mild disease in the RCA Advised to treated CAD medically at that time.   There is great concern that patient is not able to care for himself at this time. Supporting patient as much as possible.

## 2025-03-05 NOTE — CARDIOLOGY SUMMARY
[de-identified] : 3/4/2025 Sinus bradycardia, non specific QRS, widening and anterior fascicular shantel @ 44 bpm   [de-identified] : 9/4/2024 Normal LV systolic function Mid inferior wall appears hypokinetic Moderate (grade 2) LV diastolic dysfunction with elevated LV filling pressure RV is normal size and function LA mildly dilated No aortic valve stenosis No AR Mild MR Borderline pulmonary hypertension

## 2025-03-11 ENCOUNTER — NON-APPOINTMENT (OUTPATIENT)
Age: 89
End: 2025-03-11

## 2025-03-11 ENCOUNTER — APPOINTMENT (OUTPATIENT)
Dept: ELECTROPHYSIOLOGY | Facility: CLINIC | Age: 89
End: 2025-03-11
Payer: MEDICARE

## 2025-03-11 VITALS
OXYGEN SATURATION: 98 % | BODY MASS INDEX: 26.6 KG/M2 | HEIGHT: 71 IN | SYSTOLIC BLOOD PRESSURE: 162 MMHG | TEMPERATURE: 97.4 F | WEIGHT: 190 LBS | RESPIRATION RATE: 16 BRPM | HEART RATE: 48 BPM | DIASTOLIC BLOOD PRESSURE: 85 MMHG

## 2025-03-11 DIAGNOSIS — Z98.890 OTHER SPECIFIED POSTPROCEDURAL STATES: ICD-10-CM

## 2025-03-11 DIAGNOSIS — I10 ESSENTIAL (PRIMARY) HYPERTENSION: ICD-10-CM

## 2025-03-11 DIAGNOSIS — I49.5 SICK SINUS SYNDROME: ICD-10-CM

## 2025-03-11 DIAGNOSIS — I48.0 PAROXYSMAL ATRIAL FIBRILLATION: ICD-10-CM

## 2025-03-11 DIAGNOSIS — I25.10 ATHEROSCLEROTIC HEART DISEASE OF NATIVE CORONARY ARTERY W/OUT ANGINA PECTORIS: ICD-10-CM

## 2025-03-11 DIAGNOSIS — I48.92 UNSPECIFIED ATRIAL FLUTTER: ICD-10-CM

## 2025-03-11 PROCEDURE — 93000 ELECTROCARDIOGRAM COMPLETE: CPT

## 2025-03-11 PROCEDURE — 99214 OFFICE O/P EST MOD 30 MIN: CPT

## 2025-03-11 PROCEDURE — G2211 COMPLEX E/M VISIT ADD ON: CPT

## 2025-03-11 NOTE — HISTORY OF PRESENT ILLNESS
[FreeTextEntry1] : Mr. Hong Kuhn is an 89 y/o man with Hx of CAD, afib s/p atrial flutter ablation 3/30/23 on amiodarone, HTN, hyperlipidemia, SVT,CKD who presents for an initial evaluation. Pt was seen by cardiogloy this week and had concerns about significant bradycardia. ECG: 3/4/2025 with Sinus bradycardia, nonspecific QRS, widening and anterior fascicular shantel @ 44 bpm. Last Echo 9/4/2024 Normal LV systolic function, Mid inferior wall appears hypokinetic Moderate (grade 2) LV diastolic dysfunction with elevated LV filling pressure RV is normal size and function LA mildly dilated No aortic valve stenosis, No AR, Mild MR and Borderline pulmonary hypertension. Last cardiac cath from March of 2022: Showed severe disease in the distal LAD and distal LIMIA that is not intervenable Mild disease in the RCA Advised to treated CAD medically at that time. Petra he was taken off his amiodarone 20 mg last week by Dr. Bee and is still taking metoprolol 205 mg daily. Reports he has dizziness with no syncope. He is taking hydralazine 50 mg daily not bid but takes the lisinopril, hydralazine and metoprolol all at the same time. He can walk and climbs stairs without dyspnea but has been more fatigued. Denies chest pain, palpitations, SOB, syncope or near syncope.

## 2025-03-11 NOTE — DISCUSSION/SUMMARY
[EKG obtained to assist in diagnosis and management of assessed problem(s)] : EKG obtained to assist in diagnosis and management of assessed problem(s) [FreeTextEntry1] : Impression:  1. Sinus node dysfunction:  EKG performed today to assess for presence of conduction disease and reveals marked sinus bradycardia at 46 bpm. He stopped amiodarone last week, but it has a long half life. Will discontinue metoprolol 25 mg daily. Will put on a 2 wee Zio AT to assess bradycardia and need for pacemaker.   2. Afib s/p ablation aflutter ablation ( 2023) He was on amiodarone but d/c last week. D.C metoprolol. Continue on Eliquis 2.5 mg bid.   3. HTN; Encourage heart healthy diet, sodium restriction and weight management.. Continue regular f/u with Cardiologist for further HTN management. Stagger medications and reduce salt intake.  Plan for Holter and Continue f/u with Cardiologist and RTO for f/u in 6 weeks

## 2025-03-11 NOTE — CARDIOLOGY SUMMARY
[de-identified] : 3/11/25  3/4/25 ECG: 3/4/2025 Sinus bradycardia, non specific QRS, widening and anterior fascicular shantel @ 44 bpm   [de-identified] : Echo: 9/4/2024 Normal LV systolic function Mid inferior wall appears hypokinetic Moderate (grade 2) LV diastolic dysfunction with elevated LV filling pressure RV is normal size and function LA mildly dilated No aortic valve stenosis No AR Mild MR Borderline pulmonary hypertension   TTE 8/2024: moderate LV diastolic dysfunction, elevated filling pressures, no AI, mild MR, borderline pulm HTN, LV systolic function normal, no significant change from TTE 2023 [de-identified] :  cardiac cath from March of 2022: **Showed severe disease in the distal LAD and distal LIMIA that is not intervenale Mild disease in the RCA Advised to treated CAD medically at that time.

## 2025-03-11 NOTE — PHYSICAL EXAM
[Well Developed] : well developed [Well Nourished] : well nourished [No Acute Distress] : no acute distress [Normal Conjunctiva] : normal conjunctiva [Normal Venous Pressure] : normal venous pressure [No Carotid Bruit] : no carotid bruit [Normal S1, S2] : normal S1, S2 [No Murmur] : no murmur [Good Air Entry] : good air entry [Soft] : abdomen soft [Non Tender] : non-tender [Normal Bowel Sounds] : normal bowel sounds [Normal Gait] : normal gait [No Cyanosis] : no cyanosis [No Clubbing] : no clubbing [No Varicosities] : no varicosities [No Rash] : no rash [No Skin Lesions] : no skin lesions [Moves all extremities] : moves all extremities [No Focal Deficits] : no focal deficits [Normal Speech] : normal speech [Alert and Oriented] : alert and oriented [Normal memory] : normal memory [de-identified] : Coarse breath sounds bilaterally [de-identified] : trace lower extrmity edema bilaterally

## 2025-04-08 ENCOUNTER — APPOINTMENT (OUTPATIENT)
Dept: CARDIOLOGY | Facility: CLINIC | Age: 89
End: 2025-04-08

## 2025-04-10 ENCOUNTER — NON-APPOINTMENT (OUTPATIENT)
Age: 89
End: 2025-04-10

## 2025-04-28 ENCOUNTER — APPOINTMENT (OUTPATIENT)
Dept: ELECTROPHYSIOLOGY | Facility: CLINIC | Age: 89
End: 2025-04-28

## 2025-05-30 ENCOUNTER — APPOINTMENT (OUTPATIENT)
Dept: OPHTHALMOLOGY | Facility: CLINIC | Age: 89
End: 2025-05-30
Payer: MEDICARE

## 2025-05-30 ENCOUNTER — NON-APPOINTMENT (OUTPATIENT)
Age: 89
End: 2025-05-30

## 2025-05-30 PROCEDURE — 92134 CPTRZ OPH DX IMG PST SGM RTA: CPT

## 2025-05-30 PROCEDURE — 92004 COMPRE OPH EXAM NEW PT 1/>: CPT
